# Patient Record
Sex: MALE | Race: WHITE | Employment: OTHER | URBAN - METROPOLITAN AREA
[De-identification: names, ages, dates, MRNs, and addresses within clinical notes are randomized per-mention and may not be internally consistent; named-entity substitution may affect disease eponyms.]

---

## 2017-09-22 ENCOUNTER — HOSPITAL ENCOUNTER (EMERGENCY)
Facility: CLINIC | Age: 77
Discharge: HOME | End: 2017-09-22
Attending: EMERGENCY MEDICINE
Payer: MEDICARE

## 2017-09-22 VITALS
TEMPERATURE: 98.4 F | HEART RATE: 90 BPM | OXYGEN SATURATION: 97 % | DIASTOLIC BLOOD PRESSURE: 91 MMHG | RESPIRATION RATE: 18 BRPM | SYSTOLIC BLOOD PRESSURE: 154 MMHG

## 2017-09-22 DIAGNOSIS — T14.8XXA WOUND INFECTION: Primary | ICD-10-CM

## 2017-09-22 DIAGNOSIS — L08.9 WOUND INFECTION: Primary | ICD-10-CM

## 2017-09-22 PROCEDURE — 99282 EMERGENCY DEPT VISIT SF MDM: CPT

## 2017-09-22 PROCEDURE — 6370000000 HC RX 637 (ALT 250 FOR IP): Performed by: EMERGENCY MEDICINE

## 2017-09-22 PROCEDURE — 87070 CULTURE OTHR SPECIMN AEROBIC: CPT

## 2017-09-22 PROCEDURE — 87205 SMEAR GRAM STAIN: CPT

## 2017-09-22 PROCEDURE — 87186 SC STD MICRODIL/AGAR DIL: CPT

## 2017-09-22 PROCEDURE — 87077 CULTURE AEROBIC IDENTIFY: CPT

## 2017-09-22 PROCEDURE — 86403 PARTICLE AGGLUT ANTBDY SCRN: CPT

## 2017-09-22 RX ORDER — IBUPROFEN 800 MG/1
800 TABLET ORAL EVERY 6 HOURS PRN
Status: ON HOLD | COMMUNITY
End: 2021-10-26 | Stop reason: SDUPTHER

## 2017-09-22 RX ORDER — CEPHALEXIN 500 MG/1
500 CAPSULE ORAL 4 TIMES DAILY
Qty: 28 CAPSULE | Refills: 0 | Status: SHIPPED | OUTPATIENT
Start: 2017-09-22 | End: 2017-09-29

## 2017-09-22 RX ORDER — SULFAMETHOXAZOLE AND TRIMETHOPRIM 800; 160 MG/1; MG/1
1 TABLET ORAL ONCE
Status: COMPLETED | OUTPATIENT
Start: 2017-09-22 | End: 2017-09-22

## 2017-09-22 RX ORDER — CEPHALEXIN 250 MG/1
500 CAPSULE ORAL ONCE
Status: COMPLETED | OUTPATIENT
Start: 2017-09-22 | End: 2017-09-22

## 2017-09-22 RX ORDER — SULFAMETHOXAZOLE AND TRIMETHOPRIM 800; 160 MG/1; MG/1
1 TABLET ORAL 2 TIMES DAILY
Qty: 14 TABLET | Refills: 0 | Status: SHIPPED | OUTPATIENT
Start: 2017-09-22 | End: 2017-09-29

## 2017-09-22 RX ADMIN — SULFAMETHOXAZOLE AND TRIMETHOPRIM 1 TABLET: 800; 160 TABLET ORAL at 19:49

## 2017-09-22 RX ADMIN — CEPHALEXIN 500 MG: 250 CAPSULE ORAL at 19:49

## 2017-09-25 ENCOUNTER — HOSPITAL ENCOUNTER (OUTPATIENT)
Dept: WOUND CARE | Age: 77
Discharge: HOME | End: 2017-09-25
Payer: MEDICARE

## 2017-09-25 ENCOUNTER — HOSPITAL ENCOUNTER (OUTPATIENT)
Age: 77
Discharge: HOME | End: 2017-09-25
Payer: MEDICARE

## 2017-09-25 VITALS
SYSTOLIC BLOOD PRESSURE: 155 MMHG | WEIGHT: 223 LBS | RESPIRATION RATE: 18 BRPM | BODY MASS INDEX: 29.55 KG/M2 | DIASTOLIC BLOOD PRESSURE: 91 MMHG | TEMPERATURE: 98.2 F | HEIGHT: 73 IN | HEART RATE: 87 BPM

## 2017-09-25 DIAGNOSIS — M86.471 CHRONIC OSTEOMYELITIS OF RIGHT FOOT WITH DRAINING SINUS (HCC): ICD-10-CM

## 2017-09-25 DIAGNOSIS — G60.9 IDIOPATHIC PERIPHERAL NEUROPATHY: ICD-10-CM

## 2017-09-25 LAB
ESTIMATED AVERAGE GLUCOSE: 117 MG/DL
HBA1C MFR BLD: 5.7 % (ref 4–6)

## 2017-09-25 PROCEDURE — 99204 OFFICE O/P NEW MOD 45 MIN: CPT

## 2017-09-25 PROCEDURE — 36415 COLL VENOUS BLD VENIPUNCTURE: CPT

## 2017-09-25 PROCEDURE — 83036 HEMOGLOBIN GLYCOSYLATED A1C: CPT

## 2017-09-25 ASSESSMENT — PAIN SCALES - GENERAL
PAINLEVEL_OUTOF10: 0
PAINLEVEL_OUTOF10: 0

## 2017-09-25 ASSESSMENT — PAIN DESCRIPTION - PAIN TYPE: TYPE: CHRONIC PAIN

## 2017-09-26 LAB
CULTURE: ABNORMAL
DIRECT EXAM: ABNORMAL
Lab: ABNORMAL
ORGANISM: ABNORMAL
SPECIMEN DESCRIPTION: ABNORMAL
SPECIMEN DESCRIPTION: ABNORMAL
STATUS: ABNORMAL

## 2017-10-09 ENCOUNTER — HOSPITAL ENCOUNTER (OUTPATIENT)
Age: 77
Discharge: HOME | End: 2017-10-09
Payer: MEDICARE

## 2017-10-09 ENCOUNTER — HOSPITAL ENCOUNTER (OUTPATIENT)
Dept: WOUND CARE | Age: 77
Discharge: HOME | End: 2017-10-09
Payer: MEDICARE

## 2017-10-09 VITALS
RESPIRATION RATE: 18 BRPM | DIASTOLIC BLOOD PRESSURE: 88 MMHG | BODY MASS INDEX: 29.55 KG/M2 | SYSTOLIC BLOOD PRESSURE: 147 MMHG | HEART RATE: 92 BPM | HEIGHT: 73 IN | TEMPERATURE: 96.8 F | WEIGHT: 223 LBS

## 2017-10-09 DIAGNOSIS — M86.471 CHRONIC OSTEOMYELITIS OF RIGHT FOOT WITH DRAINING SINUS (HCC): ICD-10-CM

## 2017-10-09 DIAGNOSIS — M86.471 CHRONIC OSTEOMYELITIS OF RIGHT FOOT WITH DRAINING SINUS (HCC): Primary | ICD-10-CM

## 2017-10-09 LAB — PREALBUMIN: 16.1 MG/DL (ref 20–40)

## 2017-10-09 PROCEDURE — 99213 OFFICE O/P EST LOW 20 MIN: CPT

## 2017-10-09 PROCEDURE — 84134 ASSAY OF PREALBUMIN: CPT

## 2017-10-09 PROCEDURE — 36415 COLL VENOUS BLD VENIPUNCTURE: CPT

## 2017-10-09 RX ORDER — DOXYCYCLINE HYCLATE 100 MG/1
100 CAPSULE ORAL 2 TIMES DAILY
COMMUNITY
End: 2017-10-16

## 2017-10-09 ASSESSMENT — PAIN SCALES - GENERAL: PAINLEVEL_OUTOF10: 0

## 2017-10-09 NOTE — PROGRESS NOTES
Patient: Celena Christine  YOB: 1940  MRN: [id number]      HPI: Kenya Cobos is a 76 y.o. male who was seen 10/9/2017 for follow-up at the [facility]. He is seen for follow up of osteomyelitis of the right foot. He has had an MRI of the right foot which shows persistent  Osteo of the first MP joint. His initial wound began in Oct 2016 in [place]. He had surgery at the [facility] in Feb 2017. He underwent 6 weeks of IV antibiotics in Aug 2017. The most recent MRI shows persistent osteo. He has developed pain and erythema on the tight foot starting on Oct 4. He was restarted on Doxycycline that date. Patient Active Problem List   Diagnosis Code    [facility] of right [facility] ([facility] M86.471    Idiopathic peripheral neuropathy G60.9     No past medical history on file. No past surgical history on file. Current Outpatient Prescriptions:     doxycycline hyclate (VIBRAMYCIN) 100 MG capsule, Take 100 mg by mouth 2 times daily Ordered per Dr Gómez Dang for 8 days, Disp: , Rfl:     ibuprofen (ADVIL;MOTRIN) 800 MG tablet, Take 800 mg by mouth every 6 hours as needed for Pain, Disp: , Rfl:   No Known Allergies  Social History   Substance Use Topics    Smoking status: Never Smoker    Smokeless tobacco: Not on file    Alcohol use No       REVIEW OF SYSTEMS:    CONSTITUTIONAL:  Denies fever, fatigue, weight loss or gain  HEENT:  Denies head trauma, change in vision, change in hearing, dysphagia or odynophagia  PULMONARY: denies shortness of breath, productive cough, or hemoptysis. CARDIOVASCULAR:Denies chest pain, palpitations, orthopnea,   GASTROINTESTINAL:  Denies abdominal pain, nausea, vomiting, diarrhea, constipation, hematochezia, melena or hematemesis.    GENITOURINARY:  Denies frequency, urgency or hesitation, denies pain with urination, denies hematuria  HEMATOLOGIC/LYMPHATIC:  Denies easy bruising or excessive bleeding, no hx of malignancy  MUSCULOSKELETAL: Denies the content of the visit, no guarantees can be provided that every mistake has been identified and corrected by editing.

## 2017-10-09 NOTE — PLAN OF CARE
Problem: Wound:  Goal: Will show signs of wound healing; wound closure and no evidence of infection  Will show signs of wound healing; wound closure and no evidence of infection   Outcome: Ongoing  Dakins moist to moist applied to right plantar foot wound covered with dry gauze and mefix tape. Continues to wear off loading shoe. To have testing ordered per Dr Belén Amaya prior to HBO treatments.     Problem: Falls - Risk of:  Goal: Will remain free from falls  Will remain free from falls   Outcome: Met This Shift   [place] VISIT

## 2017-10-10 ENCOUNTER — HOSPITAL ENCOUNTER (OUTPATIENT)
Age: 77
Setting detail: SPECIMEN
Discharge: HOME | End: 2017-10-10
Payer: MEDICARE

## 2017-10-11 ENCOUNTER — PRE-PROCEDURE TELEPHONE (OUTPATIENT)
Dept: WOUND CARE | Age: 77
End: 2017-10-11

## 2017-10-14 LAB
CULTURE: ABNORMAL
DIRECT EXAM: ABNORMAL
Lab: ABNORMAL
Lab: ABNORMAL
ORGANISM: ABNORMAL
SPECIMEN DESCRIPTION: ABNORMAL
SPECIMEN DESCRIPTION: ABNORMAL
STATUS: ABNORMAL
STATUS: ABNORMAL

## 2017-10-16 ENCOUNTER — HOSPITAL ENCOUNTER (OUTPATIENT)
Dept: WOUND CARE | Age: 77
Discharge: HOME | End: 2017-10-16
Payer: MEDICARE

## 2017-10-16 VITALS
RESPIRATION RATE: 18 BRPM | BODY MASS INDEX: 29.55 KG/M2 | SYSTOLIC BLOOD PRESSURE: 170 MMHG | TEMPERATURE: 97.3 F | HEART RATE: 85 BPM | WEIGHT: 223 LBS | HEIGHT: 73 IN | DIASTOLIC BLOOD PRESSURE: 87 MMHG

## 2017-10-16 DIAGNOSIS — E44.1 MILD PROTEIN MALNUTRITION (HCC): ICD-10-CM

## 2017-10-16 PROCEDURE — 99213 OFFICE O/P EST LOW 20 MIN: CPT

## 2017-10-16 RX ORDER — CEFEPIME 1 G/50ML
1 INJECTION, SOLUTION INTRAVENOUS EVERY 12 HOURS
COMMUNITY
End: 2017-12-11

## 2017-10-16 ASSESSMENT — PAIN SCALES - GENERAL: PAINLEVEL_OUTOF10: 0

## 2017-10-16 NOTE — PLAN OF CARE
Problem: Wound:  Goal: Will show signs of wound healing; wound closure and no evidence of infection  Will show signs of wound healing; wound closure and no evidence of infection   Outcome: Ongoing  Right foot wound dakins moist to moist applied covered with dry gauze and tape    Problem: Falls - Risk of:  Goal: Will remain free from falls  Will remain free from falls   Outcome: Met This Shift

## 2017-10-16 NOTE — PROGRESS NOTES
Patient: Trion Tello  YOB: 1940  MRN: [id number]      HPI: Brandon Willingham is a 76 y.o. male who was seen 10/16/2017 for follow-up at the [facility]. He is seen for follow up of a plantar ulcer of the right foot with osteomyelitis of the first metatarsal head. He was started on IV antibiotics at home for the recurrence of his foot infection. He feels the foot is better. He is having minimal drainage. He had his prealbumin done. The echocardiogram and vascular studies are scheduled for Oct 17. Patient Active Problem List   Diagnosis Code    Chronic [facility]omyel[facility] of right foot with draining [facility] ([facility] M86.471    Idiopathic peripheral neuropathy G60.9     No past medical history on file. No past surgical history on file. Current Outpatient Prescriptions:     ibuprofen (ADVIL;MOTRIN) 800 MG tablet, Take 800 mg by mouth every 6 hours as needed for Pain, Disp: , Rfl:   No Known Allergies  Social History   Substance Use Topics    Smoking status: Never Smoker    Smokeless tobacco: Not on file    Alcohol use No       REVIEW OF SYSTEMS:    CONSTITUTIONAL:  Denies fever, fatigue, weight loss or gain  HEENT:  Denies head trauma, change in vision, change in hearing, dysphagia or odynophagia  PULMONARY: denies shortness of breath, productive cough, or hemoptysis. CARDIOVASCULAR:Denies chest pain, palpitations, orthopnea,   GASTROINTESTINAL:  Denies abdominal pain, nausea, vomiting, diarrhea, constipation, hematochezia, melena or hematemesis.    GENITOURINARY:  Denies frequency, urgency or hesitation, denies pain with urination, denies hematuria  HEMATOLOGIC/LYMPHATIC:  Denies easy bruising or excessive bleeding, no hx of malignancy  MUSCULOSKELETAL: Denies muscle wasting, denies chronic pain  SKIN: Denies new skin lesions, rashes or abscess  ENDOCRINE:Denies cold or hot intolerance, changes in weight  HEME/LYMPH: no history malignancy, denies lymphatic swelling, no history of easy bruising or Yellow%Wound Bed 20 10/9/2017  9:05 AM   Margins Attached edges 9/25/2017 10:51 AM   Debridement per physician None 10/9/2017  9:05 AM   Time out N/A 10/9/2017  9:05 AM       Lab  Lab Results   Component Value Date    PREALBUMIN 16.1 10/09/2017   Culture & Susceptibility     METHICILLIN RESISTANT STAPHYLOCOCCUS AUREUS     Antibiotic Interpretation GIL Status   Induced Clind Resist Resistant POSITIVE Final   Synercid  NOT REPORTED Final   cefoxitin screen  NOT REPORTED Final   ciprofloxacin  NOT REPORTED Final   clindamycin Resistant <=0.25 RESISTANT Final   erythromycin Resistant >=8 RESISTANT Final   gentamicin Sensitive <=0.5 SUSCEPTIBLE Final   levofloxacin Resistant >=8 RESISTANT Final   linezolid  NOT REPORTED Final   moxifloxacin  NOT REPORTED Final   nitrofurantoin  NOT REPORTED Final   oxacillin Resistant >=4 RESISTANT Final   penicillin Resistant >=0.5 RESISTANT Final   rifampin  NOT REPORTED Final   tetracycline Resistant >=16 RESISTANT Final   tigecycline  NOT REPORTED Final   trimethoprim-sulfamethoxazole Resistant >=320 RESISTANT Final   vancomycin Sensitive 1 SUSCEPTIBLE Final         PSEUDOMONAS AERUGINOSA     Antibiotic Interpretation GIL Status   amikacin  NOT REPORTED Final   ampicillin  NOT REPORTED Final   ampicillin-sulbactam  NOT REPORTED Final   ceFAZolin  NOT REPORTED Final   cefTRIAXone  NOT REPORTED Final   cefepime Sensitive 2 SUSCEPTIBLE Final   ciprofloxacin Intermediate 2 INTERMEDIATE Final   gentamicin Sensitive <=1 SUSCEPTIBLE Final   meropenem  NOT REPORTED Final   nitrofurantoin  NOT REPORTED Final   piperacillin-tazobactam Sensitive 8 SUSCEPTIBLE Final   tigecycline  NOT REPORTED Final   tobramycin Sensitive <=1 SUSCEPTIBLE Final   trimethoprim-sulfamethoxazole  NOT REPORTED Final          Assessment:  1. Chronic osteomyelitis of right foot with draining sinus ([facility] [M86.471]  2. Idiopathic peripheral neuropathy [G60.9]  3.  Mild protein malnutrition ([facility] [E44.1]    Discussion: The patient's cellulitis is less with the initiation of IV antibiotics. I have reviewed the prealbumin results with him. I have reviewed his dietary habits. He has cereal and an occasional egg for breakfast, a sandwich for lunch, and a TV dinner in trhe evening. I have explained for wound healing I need to increase his nutrition. Plan:  1. Await results of echocardiogram and TCOM's.  2, Continue Dakin's moist dressing  3. Start protein supplements     This note was created with the assistance of a speech-recognition program.  Although the intention is to generate a document that actually reflects the content of the visit, no guarantees can be provided that every mistake has been identified and corrected by editing.

## 2017-10-16 NOTE — PLAN OF CARE
Area measuring . 8x1. 0 appears to be open on right 3rd plantar toe:however pt states this does this occasionally and heals on its own. He states it is not draining and not a wound.

## 2017-10-17 ENCOUNTER — HOSPITAL ENCOUNTER (OUTPATIENT)
Dept: VASCULAR LAB | Age: 77
Discharge: HOME | End: 2017-10-17
Payer: MEDICARE

## 2017-10-17 ENCOUNTER — HOSPITAL ENCOUNTER (OUTPATIENT)
Dept: NON INVASIVE DIAGNOSTICS | Age: 77
Discharge: HOME | End: 2017-10-17
Payer: MEDICARE

## 2017-10-17 DIAGNOSIS — M86.471 CHRONIC OSTEOMYELITIS OF RIGHT FOOT WITH DRAINING SINUS (HCC): ICD-10-CM

## 2017-10-17 LAB
LV EF: 55 %
LVEF MODALITY: NORMAL

## 2017-10-17 PROCEDURE — 93306 TTE W/DOPPLER COMPLETE: CPT

## 2017-10-17 PROCEDURE — 93923 UPR/LXTR ART STDY 3+ LVLS: CPT

## 2017-10-30 ENCOUNTER — HOSPITAL ENCOUNTER (OUTPATIENT)
Dept: WOUND CARE | Age: 77
Discharge: HOME | End: 2017-10-30
Payer: MEDICARE

## 2017-10-30 VITALS
DIASTOLIC BLOOD PRESSURE: 89 MMHG | TEMPERATURE: 96.9 F | RESPIRATION RATE: 20 BRPM | WEIGHT: 223 LBS | HEIGHT: 73 IN | BODY MASS INDEX: 29.55 KG/M2 | HEART RATE: 74 BPM | SYSTOLIC BLOOD PRESSURE: 188 MMHG

## 2017-10-30 PROCEDURE — 11042 DBRDMT SUBQ TIS 1ST 20SQCM/<: CPT

## 2017-10-30 NOTE — PLAN OF CARE
Noted to right foot third digit 0.2 x 0.3 x0.1  Spot all epithelial,intact 100% pink, no drainage noted

## 2017-10-30 NOTE — PLAN OF CARE
Problem: Wound:  Goal: Will show signs of wound healing; wound closure and no evidence of infection  Will show signs of wound healing; wound closure and no evidence of infection   Outcome: Ongoing  Wound appears healing, clean and intact- no evidence of infection in appearance. Pt is on IV antibiotics x 2 .  See lda's  Continue to assess skin integrity with each visit and note any change

## 2017-10-30 NOTE — PLAN OF CARE
Problem: Falls - Risk of:  Goal: Will remain free from falls  Will remain free from falls   Outcome: Met This Shift  NO FALLS DURING OUTPATIENT W[facility] VISIT, STAFF AT SIDE T/O VISIT, NOTED PT WITH A LIMP BUT STEADY GAIT

## 2017-10-30 NOTE — PROGRESS NOTES
Patient: Yane Stephenson  YOB: 1940  MRN: [id number]      HPI: Neville Carlisle is a 76 y.o. male who was seen 10/30/2017 for follow-up at the [facility]. He is seen for follow up of osteomyelitis of the right distal first metatarsal head. His history is well documented on my prior notes. He was started on Vancomycin one week ago. He reports almost no drainage. He did have his echocardiogram and TCO2 done. He has been on IV antibiotics for 3 weeks. Patient Active Problem List   Diagnosis Code    Chronic osteomyelitis of right foot with draining sinus ([facility]) M86.471    Idiopathic peripheral neuropathy G60.9    Mild protein malnutrition ([facility]) E44.1     Past Medical History:   Diagnosis Date    Arm fracture     Back injury     Eczema     Femur fracture ([facility])     GERD (gastroesophageal reflux disease)     Glaucoma     Hypertension     MRSA (methicillin resistant staph aureus) culture positive     pseudomonas e coli    MSSA (methicillin susceptible Staphylococcus aureus)     Osteomyelitis ([facility])     right foot     Shoulder fracture     Ulcerated, foot ([address]     wound     Past Surgical History:   Procedure Laterality Date    BACK SURGERY      COLONOSCOPY      HIP SURGERY      ORIF left hip       Current Outpatient Prescriptions:     vancomycin (VANCOCIN) 1000 MG injection, Infuse 1 g intravenously 2 times daily, Disp: , Rfl:     cefepime (MAXIPIME) 1 GM/50ML, Infuse 1 g intravenously every 12 hours, Disp: , Rfl:     ibuprofen (ADVIL;MOTRIN) 800 MG tablet, Take 800 mg by mouth every 6 hours as needed for Pain, Disp: , Rfl:   Allergies   Allergen Reactions    Sulfa Antibiotics      Social History   Substance Use Topics    Smoking status: Never Smoker    Smokeless tobacco: Never Used    Alcohol use No       REVIEW OF SYSTEMS:    CONSTITUTIONAL:  Denies fever, fatigue, weight loss or gain  HEENT:  Denies head trauma, change in vision, change in hearing, dysphagia or odynophagia.  No at anterior below great toe . Electrode C: 83 mmHg placed at anterior below 5th digit . Electrode D: 74 mmHg placed at posterior foot .    Left Measurements   Electrode E: 80 mmHg placed at anterior top of foot .        Conclusions        Summary        TcPO2 levels from the right foot and dorsum of the left foot show values    within the normal range which should be adequate for spontaneous wound    healing.        Signature        ----------------------------------------------------------------    Electronically signed by Suzie Moran(Sonographer) on    10/17/2017 03:01 PM    ----------------------------------------------------------------        ----------------------------------------------------------------    Electronically signed by Hailey De Anda(Interpreting   [address] [place] [address]) on 10/17/2017 11:52 PM    ----------------------------------------------------------------     Echocardiogram Complete 2D w Doppler w Color   Order: [id number]   Status:  Final result   Visible to patient:  No (Not Released) Next appt:  11/20/2017 at 10:00 AM in No Specialty Lelia Correa MD)   Details     Reading Physician Reading Date Result Priority   Unknown Provider Result 10/18/2017    Narrative     Transthoracic Echocardiography Report (TTE)     Patient Name Traci Calvillo      Date of Study               10/17/2017               Krys Donovan      Date of      1940  Gender                      Male   Birth      Age          77 year(s)  Race                              Room Number  OP          Height:                     73 inch, 185.42 cm      Corporate ID [id number]    Weight:                     220 pounds, 99.8 kg   #      Patient Acct [de-identified]   BSA:          2.24 m^2      BMI:      29.03   #                                                              kg/m^2      MR #         [id number]     Sonographer                 Lluvia Quinones      Accession #  [id number]   Interpreting Physician      Tamara Nunes      Fellow                   Referring Nurse                            Practitioner      Interpreting             Referring Physician         Rudy DANG     Type of Study      TTE procedure:2D Echocardiogram, M-Mode, Doppler, Color Doppler.     Procedure Date  Date: 10/17/2017 Start: 11:08 AM    Study Location: [facility]    History / Tech. Comments:  Chronic Osteomyelitis    Patient Status: Inpatient    Height: 73 inches Weight: 220 pounds BSA: 2.24 m^2 BMI: 29.03 kg/m^2    CONCLUSIONS    Summary  Normal LV systolic function with EF 55%. Reduced LV diastolic compliance. Mild mitral and tricuspid regurgitation. Signature  ----------------------------------------------------------------------------   Electronically signed by Phani Flores(Sonographer) on 10/17/2017 02:39   PM  ----------------------------------------------------------------------------    ----------------------------------------------------------------------------   Electronically signed by Tamara Nunes(Interpreting physician) on   10/18/2017 07:53 AM               Assessment:  1. Chronic osteomyelitis of right foot with draining sinus ([facility] [M86.471]  2. Idiopathic peripheral neuropathy [G60.9]  3. Mild protein malnutrition ([facility] [E44.1]    Discussion: Yane Layne has had osteomyelitis dating back almost two years involving the right foot. He has had surgery at the [facility]. He had a recurrence of the osteo in the Aug 2017 treated with six weeks of intravenous antibiotics. The wound failed to heal. He was seen in late September with persistent drainage. A repeat MRI was positive and he is again on antibiotics. He has TCO2 as documented above. He has a near normal echocardigram. He has had vascular studies at the [facility]. He is not diabetic. He would benefit from HBO treatment for his refractory osteomyelitis to salvage his foot and avoid at minimum a transmatetarsal amputation. Plan:  1. Debride wound today  2.  Refer for HBO

## 2017-11-20 ENCOUNTER — HOSPITAL ENCOUNTER (OUTPATIENT)
Dept: WOUND CARE | Age: 77
Discharge: HOME | End: 2017-11-20
Payer: MEDICARE

## 2017-11-20 VITALS
HEIGHT: 73 IN | WEIGHT: 223 LBS | SYSTOLIC BLOOD PRESSURE: 177 MMHG | BODY MASS INDEX: 29.55 KG/M2 | HEART RATE: 92 BPM | TEMPERATURE: 95.5 F | RESPIRATION RATE: 18 BRPM | DIASTOLIC BLOOD PRESSURE: 97 MMHG

## 2017-11-20 PROCEDURE — 11042 DBRDMT SUBQ TIS 1ST 20SQCM/<: CPT

## 2017-11-20 ASSESSMENT — PAIN DESCRIPTION - PAIN TYPE: TYPE: ACUTE PAIN

## 2017-11-20 NOTE — PROGRESS NOTES
Patient: Conchita Raines  YOB: 1940  MRN: [id number]      HPI: Osvaldo Richards is a 76 y.o. male who was seen 11/20/2017 for follow-up at the [facility]. He is seen for follow up of osteomyelitis of the right distal first metatarsal head. His history is well documented on my prior notes. He has one more day of IV antibiotics. He was seen by Dr. Jolanta Mercado for consideration of hyperbaric treatment on Oct 31. The decision was made not to proceed with HBO. Thepatient continues to use Dakin's to his wound. He is wearing regular shoes. His only concern at this time is leg pain related to his leg length discrepancy of 14mm following hip surgery two years ago.     Patient Active Problem List   Diagnosis Code    Chronic osteomyelitis of right foot with draining sinus ([facility]) M86.471    Idiopathic peripheral neuropathy G60.9    Mild protein malnutrition ([facility]) E44.1     Past Medical History:   Diagnosis Date    Arm fracture     Back injury     Eczema     Femur fracture ([facility])     GERD (gastroesophageal reflux disease)     Glaucoma     Hypertension     MRSA (methicillin resistant staph aureus) culture positive     pseudomonas e coli    MSSA (methicillin susceptible Staphylococcus aureus)     Osteomyelitis ([facility])     right foot     Shoulder fracture     Ulcerated, foot ([address]     wound     Past Surgical History:   Procedure Laterality Date    BACK SURGERY      COLONOSCOPY      HIP SURGERY      ORIF left hip       Current Outpatient Prescriptions:     vancomycin (VANCOCIN) 1000 MG injection, Infuse 1 g intravenously 2 times daily, Disp: , Rfl:     cefepime (MAXIPIME) 1 GM/50ML, Infuse 1 g intravenously every 12 hours, Disp: , Rfl:     ibuprofen (ADVIL;MOTRIN) 800 MG tablet, Take 800 mg by mouth every 6 hours as needed for Pain, Disp: , Rfl:   Allergies   Allergen Reactions    Sulfa Antibiotics      Social History   Substance Use Topics    Smoking status: Never Smoker    Smokeless tobacco: Never Used  Alcohol use No       REVIEW OF SYSTEMS:    CONSTITUTIONAL:  Denies fever, fatigue, weight loss or gain  HEENT:  Denies head trauma, change in vision, change in hearing, dysphagia or odynophagia  PULMONARY: denies shortness of breath, productive cough, or hemoptysis. CARDIOVASCULAR:Denies chest pain, palpitations, orthopnea,   GASTROINTESTINAL:  Denies abdominal pain, nausea, vomiting, diarrhea, constipation, hematochezia, melena or hematemesis. GENITOURINARY:  Denies frequency, urgency or hesitation, denies pain with urination, denies hematuria  HEMATOLOGIC/LYMPHATIC:  Denies easy bruising or excessive bleeding, no hx of malignancy  MUSCULOSKELETAL: Denies muscle wasting, denies chronic pain  SKIN: Denies new skin lesions, rashes or abscess  ENDOCRINE:Denies cold or hot intolerance, changes in weight  HEME/LYMPH: no history malignancy, denies lymphatic swelling, no history of easy bruising or bleeding. NEURO: Denies headache, double vision, gait abnormalities  PSYCH: Denies suicidal or homicidal ideations    Review of systems negative unless above.       Physical exam  CONSTITUTIONAL:  Alert and oriented, no acute distress  HEAD: normocephalic, atraumatic  EYES: Pupils equal and reactive to light, Extraocular muscles intact, sclera non icteric  ENT: Mucus membranes moist, No otorrhea, no rhinorrhea  NECK:  supple, symmetrical, trachea midline   LUNGS:  Good air movement bilaterally, unlabored respirations, no wheezes or rhonchi  CARDIOVASCULAR: Regular rate and rhythm, no murmurs rubs or gallops  ABDOMEN: soft, non tender, non distended, no rebound or guarding, no hernias, no hepatomegaly, no splenomegaly  : normal external  genitalia  MUSCULOSKELETAL:  Equal strength bilaterally, normal muscle tone  SKIN:  See wound assessment  NEUROLOGIC:  Cranial nerves 2-12 grossly intact, no focal deficits  PSYCH: affect appropriate          Wound Assessment    Pressure Ulcer 09/25/17 WD#1 RIGHT PLANTAR FOOT ETIOLOGY NEUROPATHY STARTED JANUARY 2016 (Active)   Lizy-wound Assessment Calloused 11/20/2017 10:06 AM   Lizy-Wound Texture Callus 11/20/2017 10:06 AM   Lizy-Wound Moisture Macerated 11/20/2017 10:06 AM   Wound Length (cm) 0.9 cm 11/20/2017 10:15 AM   Wound Width (cm) 0.7 cm 11/20/2017 10:15 AM   Wound Depth (cm)  0.3 11/20/2017 10:15 AM   Calculated Wound Size (cm^2) (l*w) 0.63 cm^2 11/20/2017 10:15 AM   Change in Wound Size % (l*w) 82.5 11/20/2017 10:15 AM   Dressing Changed Changed/New 10/30/2017 10:21 AM   Dressing/Treatment Other (Comment) 10/30/2017 10:21 AM   Wound Cleansed Rinsed/Irrigated with saline 11/20/2017 10:06 AM   Necrotic Amount None present 11/20/2017 10:06 AM   Drainage Amount Moderate 11/20/2017 10:06 AM   Drainage Description Serosanguinous 11/20/2017 10:06 AM   Odor None 11/20/2017 10:06 AM   Woody%Wound Bed 100 11/20/2017 10:06 AM   Margins Attached edges 9/25/2017 10:51 AM   Debridement per physician Subcutaneous 11/20/2017 10:15 AM   Time out Yes 11/20/2017 10:15 AM   Procedural Pain 0 11/20/2017 10:15 AM   Post procedural Pain 0 11/20/2017 10:15 AM         Assessment:  1. Chronic osteomyelitis of right foot with draining sinus ([facility] [M86.471]  2. Idiopathic peripheral neuropathy [G60.9]    Discussion: The patient's wound is smaller. There is biofilm that requires debridement. He will need a follow up MRI of the foot to see the results of his antibiotic therapy. Plan:  1. Debride wound today  2. Continue Dakin's moist dressings  3. F/U 3 weeks      Procedure:    A verbal consent was obtained. A timeout was performed. Lidocaine gel was applied    Debridement: Excisional Debridement    Using curette the wound was sharply debrided    down through and including the removal of subcutaneous tissue. Devitalized Tissue Debrided:  biofilm.     Percent of Wound Debrided: 100%  Total Surface Area Debrided:  0.63 sq cm  Bleeding: None  Hemostasis:   not needed    Response to treatment:  Well

## 2017-11-22 ENCOUNTER — OFFICE VISIT (OUTPATIENT)
Dept: INFECTIOUS DISEASES | Age: 77
End: 2017-11-22
Payer: MEDICARE

## 2017-11-22 VITALS
WEIGHT: 233 LBS | DIASTOLIC BLOOD PRESSURE: 94 MMHG | BODY MASS INDEX: 28.97 KG/M2 | HEART RATE: 99 BPM | SYSTOLIC BLOOD PRESSURE: 161 MMHG | TEMPERATURE: 97.6 F | HEIGHT: 75 IN

## 2017-11-22 DIAGNOSIS — S91.301D OPEN WOUND OF RIGHT FOOT, SUBSEQUENT ENCOUNTER: ICD-10-CM

## 2017-11-22 DIAGNOSIS — M86.671 OTHER CHRONIC OSTEOMYELITIS OF RIGHT FOOT (HCC): ICD-10-CM

## 2017-11-22 PROBLEM — M86.9 FOOT OSTEOMYELITIS, RIGHT (HCC): Status: ACTIVE | Noted: 2017-11-22

## 2017-11-22 PROBLEM — S91.301A OPEN WOUND OF RIGHT FOOT: Status: ACTIVE | Noted: 2017-11-22

## 2017-11-22 PROCEDURE — G8427 DOCREV CUR MEDS BY ELIG CLIN: HCPCS | Performed by: INTERNAL MEDICINE

## 2017-11-22 PROCEDURE — 1036F TOBACCO NON-USER: CPT | Performed by: INTERNAL MEDICINE

## 2017-11-22 PROCEDURE — 1123F ACP DISCUSS/DSCN MKR DOCD: CPT | Performed by: INTERNAL MEDICINE

## 2017-11-22 PROCEDURE — 4040F PNEUMOC VAC/ADMIN/RCVD: CPT | Performed by: INTERNAL MEDICINE

## 2017-11-22 PROCEDURE — G8419 CALC BMI OUT NRM PARAM NOF/U: HCPCS | Performed by: INTERNAL MEDICINE

## 2017-11-22 PROCEDURE — G8484 FLU IMMUNIZE NO ADMIN: HCPCS | Performed by: INTERNAL MEDICINE

## 2017-11-22 PROCEDURE — 99213 OFFICE O/P EST LOW 20 MIN: CPT | Performed by: INTERNAL MEDICINE

## 2017-11-23 NOTE — PROGRESS NOTES
intravenously every 12 hours, Disp: , Rfl:     ibuprofen (ADVIL;MOTRIN) 800 MG tablet, Take 800 mg by mouth every 6 hours as needed for Pain, Disp: , Rfl:      Review of Systems:  CONSTITUTIONAL:  negative  EYES:  negative  HEENT:  negative  RESPIRATORY:  negative  CARDIOVASCULAR:  negative  GASTROINTESTINAL:  negative  GENITOURINARY:  negative  INTEGUMENT/BREAST:  Rt foot wound   HEMATOLOGIC/LYMPHATIC:  negative  ALLERGIC/IMMUNOLOGIC:  negative  ENDOCRINE:  negative  MUSCULOSKELETAL:  negative  NEUROLOGICAL:  negative  BEHAVIOR/PSYCH:  negative    BP (!) 161/94   Pulse 99   Temp 97.6 °F (36.4 °C)   Ht 6' 3\" (1.905 m)   Wt 233 lb (105.7 kg)   BMI 29.12 kg/m²       EXAM:  CONSTITUTIONAL:  awake, alert, cooperative, no apparent distress,  EYES: conjunctiva normal  ENT:  Normocephalic, without obvious abnormality, atraumatic,  LUNGS:  No increased work of breathing, good air exchange, clear to auscultation bilaterally, no crackles or wheezing  CARDIOVASCULAR:  regular rate and rhythm, normal S1 and S2,  no murmur noted  ABDOMEN:   normal bowel sounds, soft, non-distended, non-tender, no masses palpated, no hepatosplenomegally,   MUSCULOSKELETAL:  There is no redness, warmth, or swelling of the joints. Rt foot wound with no purulence   NEUROLOGIC:  Awake, alert, oriented to name, place and time  SKIN:  No rash      Data Review:    CBC with Diff  No results found for requested labs within last 30 days. BMP  No results found for requested labs within last 30 days. LFT  No results found for requested labs within last 30 days. IMAGING:          Ko Cespedes MD  11/22/2017  8:21 PM      This note was completed using a voice transcription system. Every effort was made to ensure accuracy. However, inadvertent computerized transcription errors may be present.

## 2017-11-30 ENCOUNTER — PRE-PROCEDURE TELEPHONE (OUTPATIENT)
Dept: WOUND CARE | Age: 77
End: 2017-11-30

## 2017-12-11 ENCOUNTER — HOSPITAL ENCOUNTER (OUTPATIENT)
Dept: WOUND CARE | Age: 77
Discharge: HOME | End: 2017-12-11
Payer: MEDICARE

## 2017-12-11 VITALS
SYSTOLIC BLOOD PRESSURE: 182 MMHG | WEIGHT: 233 LBS | BODY MASS INDEX: 28.97 KG/M2 | HEIGHT: 75 IN | DIASTOLIC BLOOD PRESSURE: 98 MMHG | TEMPERATURE: 97.3 F | HEART RATE: 89 BPM

## 2017-12-11 PROCEDURE — 99212 OFFICE O/P EST SF 10 MIN: CPT

## 2017-12-11 ASSESSMENT — PAIN SCALES - GENERAL: PAINLEVEL_OUTOF10: 0

## 2017-12-11 NOTE — PROGRESS NOTES
Wound Width (cm) 1 cm 12/11/2017  9:00 AM   Wound Depth (cm)  0.4 12/11/2017  9:00 AM   Calculated Wound Size (cm^2) (l*w) 1 cm^2 12/11/2017  9:00 AM   Change in Wound Size % (l*w) 72.22 12/11/2017  9:00 AM   Dressing Changed Changed/New 11/20/2017 10:40 AM   Dressing/Treatment Other (Comment) 11/20/2017 10:40 AM   Wound Cleansed Rinsed/Irrigated with saline 11/20/2017 10:06 AM   Necrotic Amount None present 12/11/2017  9:00 AM   Drainage Amount Moderate 12/11/2017  9:00 AM   Drainage Description Serosanguinous 12/11/2017  9:00 AM   Odor None 11/20/2017 10:06 AM   [place]%Wound Bed 100 12/11/2017  9:00 AM   Margins Attached edges 9/25/2017 10:51 AM   Debridement per physician None 12/11/2017  9:00 AM   Time out N/A 12/11/2017  9:00 AM   Procedural Pain 0 11/20/2017 10:15 AM   Post procedural Pain 0 11/20/2017 10:15 AM            Assessment:  1. Chronic osteomyelitis of right foot with draining sinus ([facility] [M86.471]  2. Idiopathic peripheral neuropathy [G60.9]      Plan:  1. Continue Dakin's moist dressings  2. Follow-up on January 8, 2018  3. Consider repeating an MRI after as January 8 evaluation. This note was created with the assistance of a speech-recognition program.  Although the intention is to generate a document that actually reflects the content of the visit, no guarantees can be provided that every mistake has been identified and corrected by editing.

## 2017-12-12 ENCOUNTER — PRE-PROCEDURE TELEPHONE (OUTPATIENT)
Dept: WOUND CARE | Age: 77
End: 2017-12-12

## 2017-12-12 NOTE — PROGRESS NOTES
Pt leaves voicemail that he has an urgent request for an order for custom offloading inserts needing faxed to [phone] attn [facility] @ [address] This writer faxed request to Dr. Deuce Quigley office at [facility] and marked as urgent. I also called patient back and left a voicemail informing and gave phone # for Dr. Deuce Quigley office if needing further assistance as  will not be returning to [address] [place] [address] until after the holidays.      Electronically signed by Alejandro Patton RN on 12/12/2017 at 1:21 PM

## 2018-01-08 ENCOUNTER — HOSPITAL ENCOUNTER (OUTPATIENT)
Dept: WOUND CARE | Age: 78
Discharge: HOME | End: 2018-01-08
Payer: MEDICARE

## 2018-01-24 ENCOUNTER — OFFICE VISIT (OUTPATIENT)
Dept: INFECTIOUS DISEASES | Age: 78
End: 2018-01-24
Payer: MEDICARE

## 2018-01-24 VITALS
RESPIRATION RATE: 16 BRPM | SYSTOLIC BLOOD PRESSURE: 153 MMHG | HEART RATE: 84 BPM | OXYGEN SATURATION: 98 % | BODY MASS INDEX: 30.93 KG/M2 | DIASTOLIC BLOOD PRESSURE: 94 MMHG | HEIGHT: 73 IN | WEIGHT: 233.4 LBS | TEMPERATURE: 97 F

## 2018-01-24 DIAGNOSIS — M86.671 OTHER CHRONIC OSTEOMYELITIS OF RIGHT FOOT (HCC): ICD-10-CM

## 2018-01-24 DIAGNOSIS — S91.301D OPEN WOUND OF RIGHT FOOT, SUBSEQUENT ENCOUNTER: Primary | ICD-10-CM

## 2018-01-24 PROCEDURE — 99213 OFFICE O/P EST LOW 20 MIN: CPT | Performed by: INTERNAL MEDICINE

## 2018-01-24 PROCEDURE — 4040F PNEUMOC VAC/ADMIN/RCVD: CPT | Performed by: INTERNAL MEDICINE

## 2018-01-24 PROCEDURE — 1036F TOBACCO NON-USER: CPT | Performed by: INTERNAL MEDICINE

## 2018-01-24 PROCEDURE — G8427 DOCREV CUR MEDS BY ELIG CLIN: HCPCS | Performed by: INTERNAL MEDICINE

## 2018-01-24 PROCEDURE — 1123F ACP DISCUSS/DSCN MKR DOCD: CPT | Performed by: INTERNAL MEDICINE

## 2018-01-24 PROCEDURE — G8484 FLU IMMUNIZE NO ADMIN: HCPCS | Performed by: INTERNAL MEDICINE

## 2018-01-24 PROCEDURE — G8417 CALC BMI ABV UP PARAM F/U: HCPCS | Performed by: INTERNAL MEDICINE

## 2018-01-24 RX ORDER — CRISABOROLE 20 MG/G
1 OINTMENT TOPICAL PRN
Refills: 2 | Status: ON HOLD | COMMUNITY
Start: 2017-12-01 | End: 2021-10-26 | Stop reason: HOSPADM

## 2018-01-25 ENCOUNTER — HOSPITAL ENCOUNTER (OUTPATIENT)
Age: 78
Setting detail: SPECIMEN
Discharge: HOME | End: 2018-01-25
Payer: MEDICARE

## 2018-01-29 LAB — DERMATOLOGY PATHOLOGY REPORT: NORMAL

## 2018-02-16 ENCOUNTER — TELEPHONE (OUTPATIENT)
Dept: INFECTIOUS DISEASES | Age: 78
End: 2018-02-16

## 2018-02-16 NOTE — TELEPHONE ENCOUNTER
Dr. Nahid White did a bone biopsy on right first ray , his mri showed positive for Osteo. They put him on Cipro for two Weeks until bone culture comes back. . After it is back they will consult with you. What kind of options for chronic abx are there. ..        I can call J Luis Adame back at [phone]

## 2018-02-23 NOTE — TELEPHONE ENCOUNTER
Marysol Mensah MD   0\"   2018 1:38 PM   Shukri Vasquez ( 1940) what was your discussion with Dr. Ramez Olivera yesterday? patient called and is confused. Betty Diallo at the office. Read 2018 1:44 PM   0\"   2018 1:44 PM   I recommend doxy. Setup f/u with me next wk so j can disuss   patient states that he stated taking medication yesterday and will be Wednesday the .

## 2018-02-28 ENCOUNTER — OFFICE VISIT (OUTPATIENT)
Dept: INFECTIOUS DISEASES | Age: 78
End: 2018-02-28
Payer: MEDICARE

## 2018-02-28 VITALS
OXYGEN SATURATION: 98 % | HEART RATE: 84 BPM | BODY MASS INDEX: 29.82 KG/M2 | SYSTOLIC BLOOD PRESSURE: 161 MMHG | HEIGHT: 73 IN | TEMPERATURE: 98.1 F | RESPIRATION RATE: 18 BRPM | DIASTOLIC BLOOD PRESSURE: 90 MMHG | WEIGHT: 225 LBS

## 2018-02-28 DIAGNOSIS — S72.90XD CLOSED FRACTURE OF FEMUR WITH ROUTINE HEALING, UNSPECIFIED FRACTURE MORPHOLOGY, UNSPECIFIED LATERALITY, UNSPECIFIED PORTION OF FEMUR, SUBSEQUENT ENCOUNTER: Primary | ICD-10-CM

## 2018-02-28 DIAGNOSIS — M86.671 OTHER CHRONIC OSTEOMYELITIS OF RIGHT FOOT (HCC): ICD-10-CM

## 2018-02-28 DIAGNOSIS — S91.301D OPEN WOUND OF RIGHT FOOT, SUBSEQUENT ENCOUNTER: ICD-10-CM

## 2018-02-28 PROCEDURE — G8417 CALC BMI ABV UP PARAM F/U: HCPCS | Performed by: INTERNAL MEDICINE

## 2018-02-28 PROCEDURE — 99213 OFFICE O/P EST LOW 20 MIN: CPT | Performed by: INTERNAL MEDICINE

## 2018-02-28 PROCEDURE — 4040F PNEUMOC VAC/ADMIN/RCVD: CPT | Performed by: INTERNAL MEDICINE

## 2018-02-28 PROCEDURE — G8427 DOCREV CUR MEDS BY ELIG CLIN: HCPCS | Performed by: INTERNAL MEDICINE

## 2018-02-28 PROCEDURE — 1036F TOBACCO NON-USER: CPT | Performed by: INTERNAL MEDICINE

## 2018-02-28 PROCEDURE — G8484 FLU IMMUNIZE NO ADMIN: HCPCS | Performed by: INTERNAL MEDICINE

## 2018-02-28 PROCEDURE — 1123F ACP DISCUSS/DSCN MKR DOCD: CPT | Performed by: INTERNAL MEDICINE

## 2018-02-28 RX ORDER — DOXYCYCLINE HYCLATE 100 MG/1
1 CAPSULE ORAL 2 TIMES DAILY
Refills: 0 | Status: ON HOLD | COMMUNITY
Start: 2018-02-22 | End: 2021-10-26 | Stop reason: HOSPADM

## 2018-02-28 RX ORDER — CIPROFLOXACIN 500 MG/1
1 TABLET, FILM COATED ORAL DAILY
Refills: 0 | Status: ON HOLD | COMMUNITY
Start: 2018-02-16 | End: 2021-10-26 | Stop reason: HOSPADM

## 2018-02-28 RX ORDER — DOXYCYCLINE HYCLATE 100 MG
100 TABLET ORAL 2 TIMES DAILY
Qty: 14 TABLET | Refills: 0 | Status: SHIPPED | OUTPATIENT
Start: 2018-02-28 | End: 2018-03-07

## 2018-04-09 ENCOUNTER — TELEPHONE (OUTPATIENT)
Dept: INFECTIOUS DISEASES | Age: 78
End: 2018-04-09

## 2018-04-11 ENCOUNTER — HOSPITAL ENCOUNTER (OUTPATIENT)
Age: 78
Setting detail: SPECIMEN
Discharge: HOME | End: 2018-04-11
Payer: MEDICARE

## 2018-04-11 ENCOUNTER — OFFICE VISIT (OUTPATIENT)
Dept: INFECTIOUS DISEASES | Age: 78
End: 2018-04-11
Payer: MEDICARE

## 2018-04-11 ENCOUNTER — HOSPITAL ENCOUNTER (OUTPATIENT)
Age: 78
Discharge: HOME | End: 2018-04-11
Payer: MEDICARE

## 2018-04-11 VITALS
HEART RATE: 103 BPM | TEMPERATURE: 97.7 F | WEIGHT: 231 LBS | HEIGHT: 73 IN | RESPIRATION RATE: 12 BRPM | SYSTOLIC BLOOD PRESSURE: 176 MMHG | BODY MASS INDEX: 30.62 KG/M2 | DIASTOLIC BLOOD PRESSURE: 94 MMHG

## 2018-04-11 DIAGNOSIS — S91.301D OPEN WOUND OF RIGHT FOOT, SUBSEQUENT ENCOUNTER: ICD-10-CM

## 2018-04-11 DIAGNOSIS — M86.9 OSTEOMYELITIS OF FOOT, UNSPECIFIED LATERALITY, UNSPECIFIED TYPE (HCC): Primary | ICD-10-CM

## 2018-04-11 DIAGNOSIS — M86.9 OSTEOMYELITIS OF FOOT, UNSPECIFIED LATERALITY, UNSPECIFIED TYPE (HCC): ICD-10-CM

## 2018-04-11 LAB
ABSOLUTE EOS #: 0.19 K/UL (ref 0–0.44)
ABSOLUTE IMMATURE GRANULOCYTE: 0.1 K/UL (ref 0–0.3)
ABSOLUTE LYMPH #: 2.16 K/UL (ref 1.1–3.7)
ABSOLUTE MONO #: 1.43 K/UL (ref 0.1–1.2)
ALBUMIN SERPL-MCNC: 3.8 G/DL (ref 3.5–5.2)
ALBUMIN/GLOBULIN RATIO: 1 (ref 1–2.5)
ALP BLD-CCNC: 84 U/L (ref 40–129)
ALT SERPL-CCNC: 14 U/L (ref 5–41)
AST SERPL-CCNC: 16 U/L
BASOPHILS # BLD: 0 % (ref 0–2)
BASOPHILS ABSOLUTE: 0.04 K/UL (ref 0–0.2)
BILIRUB SERPL-MCNC: 0.22 MG/DL (ref 0.3–1.2)
BILIRUBIN DIRECT: 0.09 MG/DL
BILIRUBIN, INDIRECT: 0.13 MG/DL (ref 0–1)
CREAT SERPL-MCNC: 0.88 MG/DL (ref 0.7–1.2)
DIFFERENTIAL TYPE: ABNORMAL
EOSINOPHILS RELATIVE PERCENT: 1 % (ref 1–4)
GFR AFRICAN AMERICAN: >60 ML/MIN
GFR NON-AFRICAN AMERICAN: >60 ML/MIN
GFR SERPL CREATININE-BSD FRML MDRD: NORMAL ML/MIN/{1.73_M2}
GFR SERPL CREATININE-BSD FRML MDRD: NORMAL ML/MIN/{1.73_M2}
GLOBULIN: ABNORMAL G/DL (ref 1.5–3.8)
IMMATURE GRANULOCYTES: 1 %
LYMPHOCYTES # BLD: 16 % (ref 24–43)
MONOCYTES # BLD: 11 % (ref 3–12)
PLATELET # BLD: 341 K/UL (ref 138–453)
PLATELET ESTIMATE: ABNORMAL
RBC # BLD: ABNORMAL 10*6/UL
SEG NEUTROPHILS: 71 % (ref 36–65)
SEGMENTED NEUTROPHILS ABSOLUTE COUNT: 9.76 K/UL (ref 1.5–8.1)
TOTAL PROTEIN: 7.5 G/DL (ref 6.4–8.3)
WBC # BLD: 13.7 K/UL (ref 3.5–11.3)
WBC # BLD: ABNORMAL 10*3/UL

## 2018-04-11 PROCEDURE — G8417 CALC BMI ABV UP PARAM F/U: HCPCS | Performed by: INTERNAL MEDICINE

## 2018-04-11 PROCEDURE — 80076 HEPATIC FUNCTION PANEL: CPT

## 2018-04-11 PROCEDURE — 99213 OFFICE O/P EST LOW 20 MIN: CPT | Performed by: INTERNAL MEDICINE

## 2018-04-11 PROCEDURE — 82565 ASSAY OF CREATININE: CPT

## 2018-04-11 PROCEDURE — 1036F TOBACCO NON-USER: CPT | Performed by: INTERNAL MEDICINE

## 2018-04-11 PROCEDURE — 85048 AUTOMATED LEUKOCYTE COUNT: CPT

## 2018-04-11 PROCEDURE — G8427 DOCREV CUR MEDS BY ELIG CLIN: HCPCS | Performed by: INTERNAL MEDICINE

## 2018-04-11 PROCEDURE — 1123F ACP DISCUSS/DSCN MKR DOCD: CPT | Performed by: INTERNAL MEDICINE

## 2018-04-11 PROCEDURE — 85049 AUTOMATED PLATELET COUNT: CPT

## 2018-04-11 PROCEDURE — 4040F PNEUMOC VAC/ADMIN/RCVD: CPT | Performed by: INTERNAL MEDICINE

## 2018-04-11 PROCEDURE — 36415 COLL VENOUS BLD VENIPUNCTURE: CPT

## 2018-04-11 RX ORDER — CIPROFLOXACIN 500 MG/1
500 TABLET, FILM COATED ORAL 2 TIMES DAILY
Qty: 28 TABLET | Refills: 0 | Status: SHIPPED | OUTPATIENT
Start: 2018-04-11 | End: 2018-04-25

## 2018-04-11 RX ORDER — CEPHALEXIN 500 MG/1
CAPSULE ORAL
Refills: 0 | Status: ON HOLD | COMMUNITY
Start: 2018-03-14 | End: 2021-10-26 | Stop reason: HOSPADM

## 2018-04-12 PROBLEM — S91.301A OPEN WOUND OF RIGHT FOOT: Status: ACTIVE | Noted: 2018-04-12

## 2018-04-13 LAB
CULTURE: ABNORMAL
CULTURE: ABNORMAL
DIRECT EXAM: ABNORMAL
Lab: ABNORMAL
ORGANISM: ABNORMAL
SPECIMEN DESCRIPTION: ABNORMAL
STATUS: ABNORMAL

## 2018-04-17 DIAGNOSIS — L03.90 WOUND CELLULITIS: Primary | ICD-10-CM

## 2018-04-17 RX ORDER — LINEZOLID 600 MG/1
600 TABLET, FILM COATED ORAL 2 TIMES DAILY
Qty: 28 TABLET | Refills: 0 | Status: SHIPPED | OUTPATIENT
Start: 2018-04-17 | End: 2018-05-01

## 2018-04-18 ENCOUNTER — TELEPHONE (OUTPATIENT)
Dept: INFECTIOUS DISEASES | Age: 78
End: 2018-04-18

## 2018-04-18 RX ORDER — LINEZOLID 600 MG/1
600 TABLET, FILM COATED ORAL 2 TIMES DAILY
Qty: 28 TABLET | Refills: 0 | Status: SHIPPED | OUTPATIENT
Start: 2018-04-18 | End: 2018-05-02

## 2018-05-16 DIAGNOSIS — M86.471 CHRONIC OSTEOMYELITIS OF RIGHT FOOT WITH DRAINING SINUS (HCC): Primary | ICD-10-CM

## 2018-05-16 DIAGNOSIS — E44.1 MILD PROTEIN MALNUTRITION (HCC): ICD-10-CM

## 2018-05-17 ENCOUNTER — TELEPHONE (OUTPATIENT)
Dept: INFECTIOUS DISEASES | Age: 78
End: 2018-05-17

## 2018-05-17 ENCOUNTER — OFFICE VISIT (OUTPATIENT)
Dept: INFECTIOUS DISEASES | Age: 78
End: 2018-05-17
Payer: MEDICARE

## 2018-05-17 ENCOUNTER — HOSPITAL ENCOUNTER (OUTPATIENT)
Age: 78
Discharge: HOME | End: 2018-05-17
Payer: MEDICARE

## 2018-05-17 VITALS
RESPIRATION RATE: 12 BRPM | WEIGHT: 232 LBS | HEIGHT: 73 IN | HEART RATE: 86 BPM | BODY MASS INDEX: 30.75 KG/M2 | SYSTOLIC BLOOD PRESSURE: 179 MMHG | DIASTOLIC BLOOD PRESSURE: 90 MMHG | TEMPERATURE: 97.5 F | OXYGEN SATURATION: 96 %

## 2018-05-17 DIAGNOSIS — S91.301D OPEN WOUND OF RIGHT FOOT, SUBSEQUENT ENCOUNTER: ICD-10-CM

## 2018-05-17 DIAGNOSIS — D72.829 LEUKOCYTOSIS, UNSPECIFIED TYPE: ICD-10-CM

## 2018-05-17 DIAGNOSIS — D72.829 LEUKOCYTOSIS, UNSPECIFIED TYPE: Primary | ICD-10-CM

## 2018-05-17 DIAGNOSIS — M86.9 OSTEOMYELITIS OF FOOT, UNSPECIFIED LATERALITY, UNSPECIFIED TYPE (HCC): ICD-10-CM

## 2018-05-17 LAB
ABSOLUTE EOS #: 0.19 K/UL (ref 0–0.44)
ABSOLUTE IMMATURE GRANULOCYTE: 0.07 K/UL (ref 0–0.3)
ABSOLUTE LYMPH #: 2.02 K/UL (ref 1.1–3.7)
ABSOLUTE MONO #: 1.2 K/UL (ref 0.1–1.2)
BASOPHILS # BLD: 0 % (ref 0–2)
BASOPHILS ABSOLUTE: 0.03 K/UL (ref 0–0.2)
DIFFERENTIAL TYPE: ABNORMAL
EOSINOPHILS RELATIVE PERCENT: 2 % (ref 1–4)
IMMATURE GRANULOCYTES: 1 %
LYMPHOCYTES # BLD: 17 % (ref 24–43)
MONOCYTES # BLD: 10 % (ref 3–12)
PLATELET ESTIMATE: ABNORMAL
RBC # BLD: ABNORMAL 10*6/UL
SEG NEUTROPHILS: 71 % (ref 36–65)
SEGMENTED NEUTROPHILS ABSOLUTE COUNT: 8.68 K/UL (ref 1.5–8.1)
WBC # BLD: 12.2 K/UL (ref 3.5–11.3)
WBC # BLD: ABNORMAL 10*3/UL

## 2018-05-17 PROCEDURE — 1036F TOBACCO NON-USER: CPT | Performed by: INTERNAL MEDICINE

## 2018-05-17 PROCEDURE — G8417 CALC BMI ABV UP PARAM F/U: HCPCS | Performed by: INTERNAL MEDICINE

## 2018-05-17 PROCEDURE — G8427 DOCREV CUR MEDS BY ELIG CLIN: HCPCS | Performed by: INTERNAL MEDICINE

## 2018-05-17 PROCEDURE — 85048 AUTOMATED LEUKOCYTE COUNT: CPT

## 2018-05-17 PROCEDURE — 99213 OFFICE O/P EST LOW 20 MIN: CPT | Performed by: INTERNAL MEDICINE

## 2018-05-17 PROCEDURE — 36415 COLL VENOUS BLD VENIPUNCTURE: CPT

## 2018-05-17 PROCEDURE — 1123F ACP DISCUSS/DSCN MKR DOCD: CPT | Performed by: INTERNAL MEDICINE

## 2018-05-17 PROCEDURE — 4040F PNEUMOC VAC/ADMIN/RCVD: CPT | Performed by: INTERNAL MEDICINE

## 2018-06-25 ENCOUNTER — TELEPHONE (OUTPATIENT)
Dept: INFECTIOUS DISEASES | Age: 78
End: 2018-06-25

## 2018-07-05 ENCOUNTER — TELEPHONE (OUTPATIENT)
Dept: INFECTIOUS DISEASES | Age: 78
End: 2018-07-05

## 2018-07-05 NOTE — TELEPHONE ENCOUNTER
rec'd voicemail message from pt re: Dr. Christie Arreaga trying to send something to his PCP but patient states that he does not have a PCP that his is semi-retired and the wound-care clinic where he was being seen closed down. I returned Mr. Vazquez's call but got his voicemail. Left message asking him to call the office back tomorrow to resolve.

## 2021-10-21 ENCOUNTER — HOSPITAL ENCOUNTER (INPATIENT)
Age: 81
LOS: 5 days | Discharge: HOME HEALTH | DRG: 617 | End: 2021-10-26
Attending: EMERGENCY MEDICINE | Admitting: INTERNAL MEDICINE
Payer: MEDICARE

## 2021-10-21 ENCOUNTER — APPOINTMENT (OUTPATIENT)
Dept: GENERAL RADIOLOGY | Age: 81
DRG: 617 | End: 2021-10-21
Payer: MEDICARE

## 2021-10-21 DIAGNOSIS — G89.18 ACUTE POST-OPERATIVE PAIN: ICD-10-CM

## 2021-10-21 DIAGNOSIS — M86.171 OTHER ACUTE OSTEOMYELITIS OF RIGHT FOOT (HCC): Primary | ICD-10-CM

## 2021-10-21 PROBLEM — M86.9 OSTEOMYELITIS OF RIGHT FOOT (HCC): Status: ACTIVE | Noted: 2021-10-21

## 2021-10-21 LAB
ABSOLUTE EOS #: 0.31 K/UL (ref 0–0.4)
ABSOLUTE IMMATURE GRANULOCYTE: 0 K/UL (ref 0–0.3)
ABSOLUTE LYMPH #: 2.95 K/UL (ref 1–4.8)
ABSOLUTE MONO #: 1.55 K/UL (ref 0.1–0.8)
ANION GAP SERPL CALCULATED.3IONS-SCNC: 11 MMOL/L (ref 9–17)
BASOPHILS # BLD: 0 % (ref 0–2)
BASOPHILS ABSOLUTE: 0 K/UL (ref 0–0.2)
BUN BLDV-MCNC: 20 MG/DL (ref 8–23)
BUN/CREAT BLD: NORMAL (ref 9–20)
CALCIUM SERPL-MCNC: 8.9 MG/DL (ref 8.6–10.4)
CHLORIDE BLD-SCNC: 104 MMOL/L (ref 98–107)
CO2: 22 MMOL/L (ref 20–31)
CREAT SERPL-MCNC: 0.97 MG/DL (ref 0.7–1.2)
DIFFERENTIAL TYPE: ABNORMAL
EOSINOPHILS RELATIVE PERCENT: 2 % (ref 1–4)
GFR AFRICAN AMERICAN: >60 ML/MIN
GFR NON-AFRICAN AMERICAN: >60 ML/MIN
GFR SERPL CREATININE-BSD FRML MDRD: NORMAL ML/MIN/{1.73_M2}
GFR SERPL CREATININE-BSD FRML MDRD: NORMAL ML/MIN/{1.73_M2}
GLUCOSE BLD-MCNC: 127 MG/DL (ref 75–110)
GLUCOSE BLD-MCNC: 97 MG/DL (ref 70–99)
HCT VFR BLD CALC: 41.3 % (ref 40.7–50.3)
HEMOGLOBIN: 13.4 G/DL (ref 13–17)
IMMATURE GRANULOCYTES: 0 %
LACTIC ACID, SEPSIS WHOLE BLOOD: 1.4 MMOL/L (ref 0.5–1.9)
LACTIC ACID, SEPSIS: NORMAL MMOL/L (ref 0.5–1.9)
LYMPHOCYTES # BLD: 19 % (ref 24–44)
MCH RBC QN AUTO: 28.8 PG (ref 25.2–33.5)
MCHC RBC AUTO-ENTMCNC: 32.4 G/DL (ref 28.4–34.8)
MCV RBC AUTO: 88.6 FL (ref 82.6–102.9)
MONOCYTES # BLD: 10 % (ref 1–7)
MORPHOLOGY: NORMAL
NRBC AUTOMATED: 0 PER 100 WBC
PDW BLD-RTO: 13.9 % (ref 11.8–14.4)
PLATELET # BLD: 483 K/UL (ref 138–453)
PLATELET ESTIMATE: ABNORMAL
PMV BLD AUTO: 9.2 FL (ref 8.1–13.5)
POTASSIUM SERPL-SCNC: 4.2 MMOL/L (ref 3.7–5.3)
RBC # BLD: 4.66 M/UL (ref 4.21–5.77)
RBC # BLD: ABNORMAL 10*6/UL
SEG NEUTROPHILS: 69 % (ref 36–66)
SEGMENTED NEUTROPHILS ABSOLUTE COUNT: 10.69 K/UL (ref 1.8–7.7)
SODIUM BLD-SCNC: 137 MMOL/L (ref 135–144)
WBC # BLD: 15.5 K/UL (ref 3.5–11.3)
WBC # BLD: ABNORMAL 10*3/UL

## 2021-10-21 PROCEDURE — 87075 CULTR BACTERIA EXCEPT BLOOD: CPT

## 2021-10-21 PROCEDURE — 6360000002 HC RX W HCPCS: Performed by: STUDENT IN AN ORGANIZED HEALTH CARE EDUCATION/TRAINING PROGRAM

## 2021-10-21 PROCEDURE — 2580000003 HC RX 258: Performed by: STUDENT IN AN ORGANIZED HEALTH CARE EDUCATION/TRAINING PROGRAM

## 2021-10-21 PROCEDURE — 87185 SC STD ENZYME DETCJ PER NZM: CPT

## 2021-10-21 PROCEDURE — 87040 BLOOD CULTURE FOR BACTERIA: CPT

## 2021-10-21 PROCEDURE — 87076 CULTURE ANAEROBE IDENT EACH: CPT

## 2021-10-21 PROCEDURE — 85025 COMPLETE CBC W/AUTO DIFF WBC: CPT

## 2021-10-21 PROCEDURE — 87070 CULTURE OTHR SPECIMN AEROBIC: CPT

## 2021-10-21 PROCEDURE — 86403 PARTICLE AGGLUT ANTBDY SCRN: CPT

## 2021-10-21 PROCEDURE — 82947 ASSAY GLUCOSE BLOOD QUANT: CPT

## 2021-10-21 PROCEDURE — 83605 ASSAY OF LACTIC ACID: CPT

## 2021-10-21 PROCEDURE — 99223 1ST HOSP IP/OBS HIGH 75: CPT | Performed by: NURSE PRACTITIONER

## 2021-10-21 PROCEDURE — 2580000003 HC RX 258: Performed by: NURSE PRACTITIONER

## 2021-10-21 PROCEDURE — 87205 SMEAR GRAM STAIN: CPT

## 2021-10-21 PROCEDURE — 36415 COLL VENOUS BLD VENIPUNCTURE: CPT

## 2021-10-21 PROCEDURE — 80048 BASIC METABOLIC PNL TOTAL CA: CPT

## 2021-10-21 PROCEDURE — 87077 CULTURE AEROBIC IDENTIFY: CPT

## 2021-10-21 PROCEDURE — 1200000000 HC SEMI PRIVATE

## 2021-10-21 PROCEDURE — 87186 SC STD MICRODIL/AGAR DIL: CPT

## 2021-10-21 PROCEDURE — 73630 X-RAY EXAM OF FOOT: CPT

## 2021-10-21 PROCEDURE — 99284 EMERGENCY DEPT VISIT MOD MDM: CPT

## 2021-10-21 RX ORDER — SODIUM CHLORIDE 9 MG/ML
INJECTION, SOLUTION INTRAVENOUS CONTINUOUS
Status: DISCONTINUED | OUTPATIENT
Start: 2021-10-21 | End: 2021-10-22

## 2021-10-21 RX ORDER — SODIUM CHLORIDE 9 MG/ML
25 INJECTION, SOLUTION INTRAVENOUS PRN
Status: DISCONTINUED | OUTPATIENT
Start: 2021-10-21 | End: 2021-10-26 | Stop reason: HOSPADM

## 2021-10-21 RX ORDER — SODIUM CHLORIDE 0.9 % (FLUSH) 0.9 %
10 SYRINGE (ML) INJECTION PRN
Status: DISCONTINUED | OUTPATIENT
Start: 2021-10-21 | End: 2021-10-26 | Stop reason: HOSPADM

## 2021-10-21 RX ORDER — POLYETHYLENE GLYCOL 3350 17 G/17G
17 POWDER, FOR SOLUTION ORAL DAILY PRN
Status: DISCONTINUED | OUTPATIENT
Start: 2021-10-21 | End: 2021-10-24

## 2021-10-21 RX ORDER — NICOTINE POLACRILEX 4 MG
15 LOZENGE BUCCAL PRN
Status: DISCONTINUED | OUTPATIENT
Start: 2021-10-21 | End: 2021-10-26 | Stop reason: HOSPADM

## 2021-10-21 RX ORDER — SODIUM CHLORIDE 0.9 % (FLUSH) 0.9 %
5-40 SYRINGE (ML) INJECTION EVERY 12 HOURS SCHEDULED
Status: DISCONTINUED | OUTPATIENT
Start: 2021-10-21 | End: 2021-10-26 | Stop reason: HOSPADM

## 2021-10-21 RX ORDER — VANCOMYCIN HYDROCHLORIDE 1 G/200ML
1000 INJECTION, SOLUTION INTRAVENOUS ONCE
Status: DISCONTINUED | OUTPATIENT
Start: 2021-10-21 | End: 2021-10-21

## 2021-10-21 RX ORDER — POTASSIUM BICARBONATE 25 MEQ/1
50 TABLET, EFFERVESCENT ORAL PRN
Status: DISCONTINUED | OUTPATIENT
Start: 2021-10-21 | End: 2021-10-26 | Stop reason: HOSPADM

## 2021-10-21 RX ORDER — POTASSIUM CHLORIDE 20 MEQ/1
40 TABLET, EXTENDED RELEASE ORAL PRN
Status: DISCONTINUED | OUTPATIENT
Start: 2021-10-21 | End: 2021-10-26 | Stop reason: HOSPADM

## 2021-10-21 RX ORDER — POTASSIUM CHLORIDE 7.45 MG/ML
10 INJECTION INTRAVENOUS PRN
Status: DISCONTINUED | OUTPATIENT
Start: 2021-10-21 | End: 2021-10-26 | Stop reason: HOSPADM

## 2021-10-21 RX ORDER — DEXTROSE MONOHYDRATE 25 G/50ML
12.5 INJECTION, SOLUTION INTRAVENOUS PRN
Status: DISCONTINUED | OUTPATIENT
Start: 2021-10-21 | End: 2021-10-26 | Stop reason: HOSPADM

## 2021-10-21 RX ORDER — ONDANSETRON 4 MG/1
4 TABLET, ORALLY DISINTEGRATING ORAL EVERY 8 HOURS PRN
Status: DISCONTINUED | OUTPATIENT
Start: 2021-10-21 | End: 2021-10-26 | Stop reason: HOSPADM

## 2021-10-21 RX ORDER — DEXTROSE MONOHYDRATE 50 MG/ML
100 INJECTION, SOLUTION INTRAVENOUS PRN
Status: DISCONTINUED | OUTPATIENT
Start: 2021-10-21 | End: 2021-10-26 | Stop reason: HOSPADM

## 2021-10-21 RX ORDER — MAGNESIUM SULFATE 1 G/100ML
1000 INJECTION INTRAVENOUS PRN
Status: DISCONTINUED | OUTPATIENT
Start: 2021-10-21 | End: 2021-10-26 | Stop reason: HOSPADM

## 2021-10-21 RX ORDER — ACETAMINOPHEN 650 MG/1
650 SUPPOSITORY RECTAL EVERY 6 HOURS PRN
Status: DISCONTINUED | OUTPATIENT
Start: 2021-10-21 | End: 2021-10-26 | Stop reason: HOSPADM

## 2021-10-21 RX ORDER — ONDANSETRON 2 MG/ML
4 INJECTION INTRAMUSCULAR; INTRAVENOUS EVERY 6 HOURS PRN
Status: DISCONTINUED | OUTPATIENT
Start: 2021-10-21 | End: 2021-10-26 | Stop reason: HOSPADM

## 2021-10-21 RX ORDER — ACETAMINOPHEN 325 MG/1
650 TABLET ORAL EVERY 6 HOURS PRN
Status: DISCONTINUED | OUTPATIENT
Start: 2021-10-21 | End: 2021-10-26 | Stop reason: HOSPADM

## 2021-10-21 RX ADMIN — VANCOMYCIN HYDROCHLORIDE 2000 MG: 1 INJECTION, POWDER, LYOPHILIZED, FOR SOLUTION INTRAVENOUS at 22:00

## 2021-10-21 RX ADMIN — PIPERACILLIN AND TAZOBACTAM 3375 MG: 3; .375 INJECTION, POWDER, FOR SOLUTION INTRAVENOUS at 21:07

## 2021-10-21 RX ADMIN — SODIUM CHLORIDE, PRESERVATIVE FREE 10 ML: 5 INJECTION INTRAVENOUS at 23:34

## 2021-10-21 RX ADMIN — SODIUM CHLORIDE: 9 INJECTION, SOLUTION INTRAVENOUS at 23:33

## 2021-10-21 ASSESSMENT — ENCOUNTER SYMPTOMS
ALLERGIC/IMMUNOLOGIC COMMENTS: SULFA ANTIBIOTICS
SHORTNESS OF BREATH: 0
ABDOMINAL PAIN: 0
TACHYPNEA: 1
COLOR CHANGE: 1

## 2021-10-21 NOTE — ED PROVIDER NOTES
[facility] ED  Emergency Department Encounter  EmergencyMedicine Resident     Pt Name:Omar Guzman  MRN: [id number]  [place] 1940  Date of evaluation: 10/21/21  PCP:  Jimbo Marin MD    This patient was evaluated in the Emergency Department for symptoms described in the history of present illness. The patient was evaluated in the context of the global COVID-19 pandemic, which necessitated consideration that the patient might be at risk for infection with the SARS-CoV-2 virus that causes COVID-19. Institutional protocols and algorithms that pertain to the evaluation of patients at risk for COVID-19 are in a state of rapid change based on information released by regulatory bodies including the CDC and federal and state organizations. These policies and algorithms were followed during the patient's care in the ED. CHIEF COMPLAINT       Chief Complaint   Patient presents with    Wound Check     HISTORY OF PRESENT ILLNESS  (Location/Symptom, Timing/Onset, Context/Setting, Quality, Duration, Modifying Factors, Severity.)      Ashleigh Clayton is a 78 y.o. male who presents with right foot wound x 3 days. Patient has a long history of leg length discrepancy with the right leg being longer after a femur fracture. He does not wear recommended shoes for this condition and sometimes has ulcerations on the plantar aspect of his feet. He has had multiple toe amputations for this issue and osteomyelitis requiring IV antibiotics. Patient reports that the foot is sore around the wound that he has had for the last 3 days and that he also has peripheral neuropathy. He denies seeing a vascular surgeon or having any vascular work-up in the past. Per chart review, was seen at a wound care clinic today at [facility] wound care and his foot was debrided and dressed. The physician there recommended he be admitted for an MRI of his foot and IV antibiotics.     PAST MEDICAL / SURGICAL / SOCIAL / FAMILY HISTORY      has a past medical history of Arm fracture, Back injury, Eczema, Femur fracture (HCC), GERD (gastroesophageal reflux disease), Glaucoma, Hypertension, Idiopathic peripheral neuropathy, MRSA (methicillin resistant staph aureus) culture positive, MSSA (methicillin susceptible Staphylococcus aureus), Osteomyelitis ([facility] Shoulder fracture, and Ulcerated, foot ([facility] has a past surgical history that includes back surgery; hip surgery; and Colonoscopy. Social History     Socioeconomic History    Marital status:      Spouse name: Not on file    Number of children: Not on file    Years of education: Not on file    Highest education level: Not on file   Occupational History    Not on file   Tobacco Use    Smoking status: Never Smoker    Smokeless tobacco: Never Used   Substance and Sexual Activity    Alcohol use: No    Drug use: No    Sexual activity: Not on file   Other Topics Concern    Not on file   Social History Narrative    Not on file     Social Determinants of Health     Financial Resource Strain:     Difficulty of Paying Living Expenses:    Food Insecurity:     Worried About Running Out of Food in the Last Year:     [address] in the Last Year:    Transportation Needs:     Lack of Transportation (Medical):  Lack of Transportation (Non-Medical):    Physical Activity:     Days of Exercise per Week:     Minutes of Exercise per Session:    Stress:     Feeling of Stress :    Social Connections:     Frequency of Communication with Friends and Family:     Frequency of Social Gatherings with Friends and Family:     Attends [employer] Services:     Active Member of Clubs or Organizations:     Attends Club or Organization Meetings:     Marital Status:    Intimate Partner Violence:     Fear of Current or Ex-Partner:     Emotionally Abused:     Physically Abused:     Sexually Abused:        No family history on file.     Allergies:  Sulfa antibiotics    Home Medications:  Prior to Admission medications    Medication Sig Start Date End Date Taking? Authorizing Provider   cephALEXin (KEFLEX) 500 MG capsule  3/14/18   Historical Provider, MD   doxycycline hyclate (VIBRAMYCIN) 100 MG capsule Take 1 capsule by mouth 2 times daily 2/22/18   Historical Provider, MD   ciprofloxacin (CIPRO) 500 MG tablet Take 1 tablet by mouth daily 2/16/18   Historical Provider, MD   EUCRISA 2 % OINT Apply 1 Applicatorful topically as needed 12/1/17   Historical Provider, MD   ibuprofen (ADVIL;MOTRIN) 800 MG tablet Take 800 mg by mouth every 6 hours as needed for Pain    Historical Provider, MD       REVIEW OF SYSTEMS    (2-9 systems for level 4, 10 or more for level 5)      Review of Systems   Constitutional: Positive for activity change. Negative for fever. Respiratory: Negative for shortness of breath. Cardiovascular: Negative for chest pain. Gastrointestinal: Negative for abdominal pain. Musculoskeletal: Positive for joint swelling. Skin: Positive for color change and wound. Allergic/Immunologic:        Sulfa antibiotics   Neurological: Positive for numbness. Negative for weakness. Hematological: Does not bruise/bleed easily. Psychiatric/Behavioral: Negative for confusion. PHYSICAL EXAM   (up to 7 for level 4, 8 or more for level 5)      INITIAL VITALS:   BP (!) 163/86   Pulse 87   Temp 97.6 °F (36.4 °C) (Oral)   Resp 22   Ht 6' 1\" (1.854 m)   Wt 214 lb (97.1 kg)   SpO2 99%   BMI 28.23 kg/m²     Physical Exam  Constitutional:       General: He is not in acute distress. HENT:      Head: Normocephalic and atraumatic. Nose: No congestion. Mouth/Throat:      Mouth: Mucous membranes are moist.   Eyes:      General: No scleral icterus. Right eye: No discharge. Left eye: No discharge. Cardiovascular:      Rate and Rhythm: Normal rate. Pulmonary:      Effort: Pulmonary effort is normal.   Abdominal:      General: Abdomen is flat.  There is Platelet Estimate NOT REPORTED     Immature Granulocytes 0 0 %    Seg Neutrophils 69 (H) 36 - 66 %    Lymphocytes 19 (L) 24 - 44 %    Monocytes 10 (H) 1 - 7 %    Eosinophils % 2 1 - 4 %    Basophils 0 0 - 2 %    Absolute Immature Granulocyte 0.00 0.00 - 0.30 k/uL    Segs Absolute 10.69 (H) 1.8 - 7.7 k/uL    Absolute Lymph # 2.95 1.0 - 4.8 k/uL    Absolute Mono # 1.55 (H) 0.1 - 0.8 k/uL    Absolute Eos # 0.31 0.0 - 0.4 k/uL    Basophils Absolute 0.00 0.0 - 0.2 k/uL    Morphology Normal    BASIC METABOLIC PANEL   Result Value Ref Range    Glucose 97 70 - 99 mg/dL    BUN 20 8 - 23 mg/dL    CREATININE 0.97 0.70 - 1.20 mg/dL    Bun/Cre Ratio NOT REPORTED 9 - 20    Calcium 8.9 8.6 - 10.4 mg/dL    Sodium 137 135 - 144 mmol/L    Potassium 4.2 3.7 - 5.3 mmol/L    Chloride 104 98 - 107 mmol/L    CO2 22 20 - 31 mmol/L    Anion Gap 11 9 - 17 mmol/L    GFR Non-African American >60 >60 mL/min    GFR African American >60 >60 mL/min    GFR Comment          GFR Staging NOT REPORTED    Lactate, Sepsis   Result Value Ref Range    Lactic Acid, Sepsis NOT REPORTED 0.5 - 1.9 mmol/L    Lactic Acid, Sepsis, Whole Blood 1.4 0.5 - 1.9 mmol/L       IMPRESSION: leukocytosis, no lactic acidosis    RADIOLOGY:  XR FOOT RIGHT (MIN 3 VIEWS)    Result Date: 10/21/2021  EXAMINATION: THREE XRAY VIEWS OF THE RIGHT FOOT 10/21/2021 5:09 pm COMPARISON: None. HISTORY: ORDERING SYSTEM PROVIDED HISTORY: osteomyelitis TECHNOLOGIST PROVIDED HISTORY: osteomyelitis FINDINGS: Status post amputation of the 1st metatarsal and phalanges. Osteotomy defect involving the 2nd metatarsophalangeal joint. Transmetatarsal amputation of the 3rd digit. Osteolytic destructive lesion involving the 4th metatarsophalangeal joint with soft tissue swelling. Also evidence of bony destruction of the distal 2nd phalanx. .  There is no evidence of fracture or dislocation. . The remaining joint spaces appear well maintained. Soft tissue swelling. Calcaneal spurs. .     Cellulitis

## 2021-10-22 ENCOUNTER — APPOINTMENT (OUTPATIENT)
Dept: MRI IMAGING | Age: 81
DRG: 617 | End: 2021-10-22
Payer: MEDICARE

## 2021-10-22 PROBLEM — I10 BENIGN ESSENTIAL HTN: Status: ACTIVE | Noted: 2021-10-22

## 2021-10-22 LAB
ANION GAP SERPL CALCULATED.3IONS-SCNC: 15 MMOL/L (ref 9–17)
BUN BLDV-MCNC: 16 MG/DL (ref 8–23)
BUN/CREAT BLD: NORMAL (ref 9–20)
CALCIUM SERPL-MCNC: 9 MG/DL (ref 8.6–10.4)
CHLORIDE BLD-SCNC: 103 MMOL/L (ref 98–107)
CO2: 20 MMOL/L (ref 20–31)
CREAT SERPL-MCNC: 0.96 MG/DL (ref 0.7–1.2)
ESTIMATED AVERAGE GLUCOSE: 128 MG/DL
GFR AFRICAN AMERICAN: >60 ML/MIN
GFR NON-AFRICAN AMERICAN: >60 ML/MIN
GFR SERPL CREATININE-BSD FRML MDRD: NORMAL ML/MIN/{1.73_M2}
GFR SERPL CREATININE-BSD FRML MDRD: NORMAL ML/MIN/{1.73_M2}
GLUCOSE BLD-MCNC: 111 MG/DL (ref 75–110)
GLUCOSE BLD-MCNC: 139 MG/DL (ref 75–110)
GLUCOSE BLD-MCNC: 143 MG/DL (ref 75–110)
GLUCOSE BLD-MCNC: 97 MG/DL (ref 70–99)
HBA1C MFR BLD: 6.1 % (ref 4–6)
HCT VFR BLD CALC: 41.7 % (ref 40.7–50.3)
HEMOGLOBIN: 13.1 G/DL (ref 13–17)
MCH RBC QN AUTO: 28.8 PG (ref 25.2–33.5)
MCHC RBC AUTO-ENTMCNC: 31.4 G/DL (ref 28.4–34.8)
MCV RBC AUTO: 91.6 FL (ref 82.6–102.9)
NRBC AUTOMATED: 0 PER 100 WBC
PDW BLD-RTO: 13.8 % (ref 11.8–14.4)
PLATELET # BLD: 426 K/UL (ref 138–453)
PMV BLD AUTO: 9.4 FL (ref 8.1–13.5)
POTASSIUM SERPL-SCNC: 3.9 MMOL/L (ref 3.7–5.3)
RBC # BLD: 4.55 M/UL (ref 4.21–5.77)
SODIUM BLD-SCNC: 138 MMOL/L (ref 135–144)
WBC # BLD: 12.6 K/UL (ref 3.5–11.3)

## 2021-10-22 PROCEDURE — 87185 SC STD ENZYME DETCJ PER NZM: CPT

## 2021-10-22 PROCEDURE — 6370000000 HC RX 637 (ALT 250 FOR IP): Performed by: INTERNAL MEDICINE

## 2021-10-22 PROCEDURE — 87075 CULTR BACTERIA EXCEPT BLOOD: CPT

## 2021-10-22 PROCEDURE — 85027 COMPLETE CBC AUTOMATED: CPT

## 2021-10-22 PROCEDURE — A9579 GAD-BASE MR CONTRAST NOS,1ML: HCPCS

## 2021-10-22 PROCEDURE — 87076 CULTURE ANAEROBE IDENT EACH: CPT

## 2021-10-22 PROCEDURE — 6360000002 HC RX W HCPCS: Performed by: NURSE PRACTITIONER

## 2021-10-22 PROCEDURE — 2580000003 HC RX 258: Performed by: NURSE PRACTITIONER

## 2021-10-22 PROCEDURE — 1200000000 HC SEMI PRIVATE

## 2021-10-22 PROCEDURE — 83036 HEMOGLOBIN GLYCOSYLATED A1C: CPT

## 2021-10-22 PROCEDURE — 6360000002 HC RX W HCPCS: Performed by: STUDENT IN AN ORGANIZED HEALTH CARE EDUCATION/TRAINING PROGRAM

## 2021-10-22 PROCEDURE — 93923 UPR/LXTR ART STDY 3+ LVLS: CPT

## 2021-10-22 PROCEDURE — 36415 COLL VENOUS BLD VENIPUNCTURE: CPT

## 2021-10-22 PROCEDURE — 73720 MRI LWR EXTREMITY W/O&W/DYE: CPT

## 2021-10-22 PROCEDURE — 99232 SBSQ HOSP IP/OBS MODERATE 35: CPT | Performed by: INTERNAL MEDICINE

## 2021-10-22 PROCEDURE — 82947 ASSAY GLUCOSE BLOOD QUANT: CPT

## 2021-10-22 PROCEDURE — 86403 PARTICLE AGGLUT ANTBDY SCRN: CPT

## 2021-10-22 PROCEDURE — 80048 BASIC METABOLIC PNL TOTAL CA: CPT

## 2021-10-22 PROCEDURE — 2580000003 HC RX 258: Performed by: STUDENT IN AN ORGANIZED HEALTH CARE EDUCATION/TRAINING PROGRAM

## 2021-10-22 PROCEDURE — 87070 CULTURE OTHR SPECIMN AEROBIC: CPT

## 2021-10-22 PROCEDURE — 6360000004 HC RX CONTRAST MEDICATION

## 2021-10-22 PROCEDURE — 99221 1ST HOSP IP/OBS SF/LOW 40: CPT | Performed by: INTERNAL MEDICINE

## 2021-10-22 PROCEDURE — 87186 SC STD MICRODIL/AGAR DIL: CPT

## 2021-10-22 PROCEDURE — 87205 SMEAR GRAM STAIN: CPT

## 2021-10-22 RX ORDER — SODIUM CHLORIDE 0.9 % (FLUSH) 0.9 %
10 SYRINGE (ML) INJECTION PRN
Status: DISCONTINUED | OUTPATIENT
Start: 2021-10-22 | End: 2021-10-26 | Stop reason: HOSPADM

## 2021-10-22 RX ORDER — CARVEDILOL 6.25 MG/1
6.25 TABLET ORAL 2 TIMES DAILY WITH MEALS
Status: DISCONTINUED | OUTPATIENT
Start: 2021-10-22 | End: 2021-10-23

## 2021-10-22 RX ORDER — VALSARTAN 80 MG/1
80 TABLET ORAL DAILY
Status: DISCONTINUED | OUTPATIENT
Start: 2021-10-22 | End: 2021-10-26 | Stop reason: HOSPADM

## 2021-10-22 RX ADMIN — PIPERACILLIN AND TAZOBACTAM 3375 MG: 3; .375 INJECTION, POWDER, FOR SOLUTION INTRAVENOUS at 12:02

## 2021-10-22 RX ADMIN — VANCOMYCIN HYDROCHLORIDE 1500 MG: 10 INJECTION, POWDER, LYOPHILIZED, FOR SOLUTION INTRAVENOUS at 20:21

## 2021-10-22 RX ADMIN — PIPERACILLIN AND TAZOBACTAM 3375 MG: 3; .375 INJECTION, POWDER, FOR SOLUTION INTRAVENOUS at 20:21

## 2021-10-22 RX ADMIN — PIPERACILLIN AND TAZOBACTAM 3375 MG: 3; .375 INJECTION, POWDER, FOR SOLUTION INTRAVENOUS at 03:00

## 2021-10-22 RX ADMIN — SODIUM CHLORIDE, PRESERVATIVE FREE 10 ML: 5 INJECTION INTRAVENOUS at 11:19

## 2021-10-22 RX ADMIN — GADOTERIDOL 18 ML: 279.3 INJECTION, SOLUTION INTRAVENOUS at 17:46

## 2021-10-22 RX ADMIN — CARVEDILOL 6.25 MG: 12.5 TABLET, FILM COATED ORAL at 18:10

## 2021-10-22 RX ADMIN — CARVEDILOL 6.25 MG: 12.5 TABLET, FILM COATED ORAL at 11:06

## 2021-10-22 RX ADMIN — VALSARTAN 80 MG: 80 TABLET, FILM COATED ORAL at 11:06

## 2021-10-22 RX ADMIN — SODIUM CHLORIDE, PRESERVATIVE FREE 20 ML: 5 INJECTION INTRAVENOUS at 20:50

## 2021-10-22 RX ADMIN — ENOXAPARIN SODIUM 40 MG: 40 INJECTION SUBCUTANEOUS at 11:08

## 2021-10-22 ASSESSMENT — ENCOUNTER SYMPTOMS
SHORTNESS OF BREATH: 0
NAUSEA: 0
GASTROINTESTINAL NEGATIVE: 1
ABDOMINAL PAIN: 0
COLOR CHANGE: 1
RESPIRATORY NEGATIVE: 1
CONSTIPATION: 0
DIARRHEA: 0
VOMITING: 0
CHEST TIGHTNESS: 0
ALLERGIC/IMMUNOLOGIC NEGATIVE: 1
EYES NEGATIVE: 1

## 2021-10-22 ASSESSMENT — PAIN SCALES - GENERAL: PAINLEVEL_OUTOF10: 0

## 2021-10-22 NOTE — PROGRESS NOTES
presents to the ER with a plantar aspect right foot wound that has been ongoing over the last 3 days. Patient had initial visit today at [facility] [address] [place] [address] who recommended admission for MRI and antibiotics. He denies any CP, SOB, ABD pain, n/v/d, HA/dizziness, cough or fevers. Imaging demonstrates cellulitis with acute osteomyelitis of the 4th metatarsophalangeal joint and adjacent bony structures and acute/chronic osteomyelitis of the distal 2nd phalanx. Patient was started on broad spectrum IV antibiotics and Podiatry was was consulted from the ER. Patient is admitted to [facility] for continued management. Review of Systems:     Constitutional:  negative for chills, fevers, sweats  Respiratory:  negative for cough, dyspnea on exertion, shortness of breath, wheezing  Cardiovascular:  negative for chest pain, chest pressure/discomfort, lower extremity edema, palpitations  Gastrointestinal:  negative for abdominal pain, constipation, diarrhea, nausea, vomiting  Neurological:  negative for dizziness, headache    Medications: Allergies:     Allergies   Allergen Reactions    Sulfa Antibiotics        Current Meds:   Scheduled Meds:    valsartan  80 mg Oral Daily    carvedilol  6.25 mg Oral BID WC    sodium chloride flush  5-40 mL IntraVENous 2 times per day    enoxaparin  40 mg SubCUTAneous Daily    piperacillin-tazobactam  3,375 mg IntraVENous Q8H    vancomycin (VANCOCIN) intermittent dosing (placeholder)   Other RX Placeholder    vancomycin  1,500 mg IntraVENous Q24H     Continuous Infusions:    sodium chloride      dextrose       PRN Meds: sodium chloride flush, sodium chloride, potassium chloride **OR** potassium alternative oral replacement **OR** potassium chloride, magnesium sulfate, ondansetron **OR** ondansetron, polyethylene glycol, acetaminophen **OR** acetaminophen, glucose, dextrose, glucagon (rDNA), dextrose    Data:     Past Medical History:   has a past medical history of Arm fracture, Back injury, Eczema, Femur fracture (HCC), GERD (gastroesophageal reflux disease), Glaucoma, Hypertension, Idiopathic peripheral neuropathy, MRSA (methicillin resistant staph aureus) culture positive, MSSA (methicillin susceptible Staphylococcus aureus), Osteomyelitis ([address] Shoulder fracture, and Ulcerated, foot ([facility] Social History:   reports that he has never smoked. He has never used smokeless tobacco. He reports that he does not drink alcohol and does not use drugs. Family History: No family history on file. Vitals:  /73   Pulse 80   Temp 97.5 °F (36.4 °C) (Oral)   Resp 16   Ht 6' 1\" (1.854 m)   Wt 214 lb (97.1 kg)   SpO2 98%   BMI 28.23 kg/m²   Temp (24hrs), Av.6 °F (36.4 °C), Min:97.2 °F (36.2 °C), Max:98.1 °F (36.7 °C)    Recent Labs     10/21/21  2337 10/22/21  0755 10/22/21  1156   POCGLU 127* 111* 139*       I/O (24Hr):     Intake/Output Summary (Last 24 hours) at 10/22/2021 1426  Last data filed at 10/22/2021 0441  Gross per 24 hour   Intake 1045 ml   Output 900 ml   Net 145 ml       Labs:  Hematology:  Recent Labs     10/21/21  1930 10/22/21  0540   WBC 15.5* 12.6*   RBC 4.66 4.55   HGB 13.4 13.1   HCT 41.3 41.7   MCV 88.6 91.6   MCH 28.8 28.8   MCHC 32.4 31.4   RDW 13.9 13.8   * 426   MPV 9.2 9.4     Chemistry:  Recent Labs     10/21/21  1930 10/22/21  0540    138   K 4.2 3.9    103   CO2 22 20   GLUCOSE 97 97   BUN 20 16   CREATININE 0.97 0.96   ANIONGAP 11 15   LABGLOM >60 >60   GFRAA >60 >60   CALCIUM 8.9 9.0     Recent Labs     10/21/21  2337 10/22/21  0540 10/22/21  0755 10/22/21  1156   LABA1C  --  6.1*  --   --    POCGLU 127*  --  111* 139*     ABG:No results found for: POCPH, PHART, PH, POCPCO2, VVV2MUV, PCO2, POCPO2, PO2ART, PO2, POCHCO3, BLE7ZRX, HCO3, NBEA, PBEA, BEART, BE, THGBART, THB, SRX9WKV, TWKN5QYE, S8XIUSOD, O2SAT, FIO2  Lab Results   Component Value Date/Time    SPECIAL NOT REPORTED 10/21/2021 08:34 PM     Lab Results   Component Value Date/Time    CULTURE PENDING 10/21/2021 08:34 PM       Radiology:  XR FOOT RIGHT (MIN 3 VIEWS)    Result Date: 10/21/2021  Cellulitis with associated acute osteomyelitis involving the 4th metatarsophalangeal joint/adjacent bony structures. Acute or chronic osteomyelitis of the distal 2nd phalanx. Physical Examination:        General appearance:  alert, cooperative and no distress  Mental Status:  oriented to person, place and time and normal affect  Lungs:  clear to auscultation bilaterally, normal effort  Heart:  regular rate and rhythm, no murmur  Abdomen:  soft, nontender, nondistended, normal bowel sounds, no masses, hepatomegaly, splenomegaly  Extremities:  no edema, redness, tenderness in the calves  Skin:  no gross lesions, rashes, induration    Assessment:        Hospital Problems         Last Modified POA    * (Principal) Osteomyelitis of right foot ([address] 10/22/2021 Yes    Benign essential HTN 10/22/2021 Yes          Plan:        1. Osteomyelitis of the right footconsult infectious disease and podiatry. Continue vancomycin and Zosyn. Likely will need long-term antibiotics. This is his third toe amputation for osteomyelitis. Likely from pressure ulcers which get infected due to history of bilateral hip fractures.   2. Essential hypertensioncontinue home medications    Andria Vinson DO  10/22/2021  2:26 PM

## 2021-10-22 NOTE — CONSULTS
[facility] - Initial Consult Note  Today's Date and Time: 10/22/2021, 6:08 PM    Impression :   1. Right foot deep plantar ulceration extending to the fourth MTP joint with septic fourth MTP joint and osteomyelitis of the fourth metatarsal and proximal phalanx of the fourth toe  2. Diabetes mellitus with associated neuropathy  3. Gastroesophageal reflux disease    Recommendations:   · Continue intravenous antimicrobial therapy with Zosyn and vancomycin. · The patient is status post transmetatarsal amputation of the right foot. · Given that the site of infection has been removed in its entirety the patient should likely be okay to discharge on oral antimicrobial therapy in the next 48-72 hours     Medical Decision Making/Summary/Discussion:10/22/2021     ·   Infection Control Recommendations   · Greer Precautions  Antimicrobial Stewardship Recommendations     · Targeted therapy  · IV to oral conversion  · PK dosing    Coordination of Outpatient Care:   · Estimated Length of IV antimicrobials:  · Patient will need Midline Catheter Insertion:   · Patient will need PICC line Insertion:  · Patient will need: Home IV , [place],  SNF,  LTAC: TBD  · Patient will need outpatient wound care:    Chief complaint/reason for consultation:   · Diabetic foot infection      History of Present Illness:   Tawnya Toledo is a 78y.o.-year-old  male who was initially admitted on 10/21/2021. Patient seen at the request of Dr. Ashly Borja:    Patient is an 78-year-old male who has a history of diabetes mellitus with associated neuropathy, gastroesophageal reflux disease, hypertension, prior bouts of osteomyelitis, bilateral hip fractures requiring open reduction internal fixation and he does have a limb length discrepancy right greater than left which has led him to get calluses and ulcerations.     The patient has a chronic right foot ulceration and he had a debrided at the wound care center and it was recommended that he be admitted for an MRI of his foot to evaluate for osteomyelitis and to be treated with IV antibiotic therapy. The patient had an MRI of the foot that showed a deep ulceration extending to the fourth MTP joint with septic fourth MTP joint and osteomyelitis of the entirety of the fourth metatarsal and proximal phalanx of the fourth toe. There were associated destructive changes of the fourth metatarsal head and neck.     The patient is to be taken to the operating room for transmetatarsal amputation of the right foot with Achilles tendon lengthening of the right lower extremity and debridement of nonviable tissue and bone in the right foot on 10-23-21     We are asked to evaluate and help with antibiotic choice. Labs, X rays reviewed: 10/22/2021    BUN:  Cr:    WBC:  Hb:  Plat:     Cultures:  Urine:  ·   Blood:  ·   Sputum :  ·   Wound:  ·     Discussed with patient, RN, family. I have personally reviewed the past medical history, past surgical history, medications, social history, and family history, and I have updated the database accordingly. Past Medical History:     Past Medical History:   Diagnosis Date    Arm fracture     Back injury     Eczema     Femur fracture (HCC)     GERD (gastroesophageal reflux disease)     Glaucoma     Hypertension     Idiopathic peripheral neuropathy 9/25/2017    MRSA (methicillin resistant staph aureus) culture positive 04/11/2018    foot    MSSA (methicillin susceptible Staphylococcus aureus)     Osteomyelitis ([address] 04/2018    right foot.  Unable to treat with IV antibiotics, pt out of town on family matter    Shoulder fracture     Ulcerated, foot ([facility]     wound       Past Surgical  History:     Past Surgical History:   Procedure Laterality Date    BACK SURGERY      COLONOSCOPY      HIP SURGERY      ORIF left hip       Medications:      valsartan  80 mg Oral Daily    carvedilol  6.25 mg Oral BID WC    sodium chloride flush  5-40 mL IntraVENous 2 times per day    enoxaparin  40 mg SubCUTAneous Daily    piperacillin-tazobactam  3,375 mg IntraVENous Q8H    vancomycin (VANCOCIN) intermittent dosing (placeholder)   Other RX Placeholder    vancomycin  1,500 mg IntraVENous Q24H       Social History:     Social History     Socioeconomic History    Marital status:      Spouse name: Not on file    Number of children: Not on file    Years of education: Not on file    Highest education level: Not on file   Occupational History    Not on file   Tobacco Use    Smoking status: Never Smoker    Smokeless tobacco: Never Used   Substance and Sexual Activity    Alcohol use: No    Drug use: No    Sexual activity: Not on file   Other Topics Concern    Not on file   Social History Narrative    Not on file     Social Determinants of Health     Financial Resource Strain:     Difficulty of Paying Living Expenses:    Food Insecurity:     Worried About Running Out of Food in the Last Year:     [address] in the Last Year:    Transportation Needs:     Lack of Transportation (Medical):  Lack of Transportation (Non-Medical):    Physical Activity:     Days of Exercise per Week:     Minutes of Exercise per Session:    Stress:     Feeling of Stress :    Social Connections:     Frequency of Communication with Friends and Family:     Frequency of Social Gatherings with Friends and Family:     Attends [employer] Services:     Active Member of Clubs or Organizations:     Attends Club or Organization Meetings:     Marital Status:    Intimate Partner Violence:     Fear of Current or Ex-Partner:     Emotionally Abused:     Physically Abused:     Sexually Abused:        Family History:   No family history on file. Allergies:   Sulfa antibiotics     Review of Systems:   Constitutional: No fevers or chills. No systemic complaints  Head: No headaches  Eyes: No double vision or blurry vision.  No conjunctival inflammation. ENT: No sore throat or runny nose. . No hearing loss, tinnitus or vertigo. Cardiovascular: No chest pain or palpitations. No shortness of breath. No ZABALA  Lung: No shortness of breath or cough. No sputum production  Abdomen: No nausea, vomiting, diarrhea, or abdominal pain. Monroe Copping No cramps. Genitourinary: No increased urinary frequency, or dysuria. No hematuria. No suprapubic or CVA pain  Musculoskeletal: No muscle aches or pains. No joint effusions, swelling or deformities. Rt TMA  Hematologic: No bleeding or bruising. Neurologic: No headache, weakness, numbness, or tingling. Integument: No rash, no ulcers. Psychiatric: No depression. Endocrine: No polyuria, no polydipsia, no polyphagia. Physical Examination :     Patient Vitals for the past 8 hrs:   Height   10/22/21 1549 6' 1\" (1.854 m)     General Appearance: Awake, alert, and in no apparent distress  Head:  Normocephalic, no trauma  Eyes: Pupils equal, round, reactive to light and accommodation; extraocular movements intact; sclera anicteric; conjunctivae pink. No embolic phenomena. ENT: Oropharynx clear, without erythema, exudate, or thrush. No tenderness of sinuses. Mouth/throat: mucosa pink and moist. No lesions. Dentition in good repair. Neck:Supple, without lymphadenopathy. Thyroid normal, No bruits. Pulmonary/Chest: Clear to auscultation, without wheezes, rales, or rhonchi. No dullness to percussion. Cardiovascular: Regular rate and rhythm without murmurs, rubs, or gallops. Abdomen: Soft, non tender. Bowel sounds normal. No organomegaly  All four Extremities: No cyanosis, clubbing, edema, or effusions. Rt TMA  Neurologic: No gross sensory or motor deficits. Skin: Warm and dry with good turgor. Signs of peripheral arterial insufficiency.  Rt TMA    Medical Decision Making -Laboratory:   I have independently reviewed/ordered the following labs:    CBC with Differential:   Recent Labs     10/21/21  1930 10/22/21  5034 WBC 15.5* 12.6*   HGB 13.4 13.1   HCT 41.3 41.7   * 426   LYMPHOPCT 19*  --    MONOPCT 10*  --      BMP:   Recent Labs     10/21/21  1930 10/22/21  0540    138   K 4.2 3.9    103   CO2 22 20   BUN 20 16   CREATININE 0.97 0.96     Hepatic Function Panel: No results for input(s): PROT, LABALBU, BILIDIR, IBILI, BILITOT, ALKPHOS, ALT, AST in the last 72 hours. No results for input(s): RPR in the last 72 hours. No results for input(s): HIV in the last 72 hours. No results for input(s): BC in the last 72 hours. Lab Results   Component Value Date    RBC 4.55 10/22/2021    WBC 12.6 10/22/2021     Lab Results   Component Value Date    CREATININE 0.96 10/22/2021    GLUCOSE 97 10/22/2021       Medical Decision Making-Imaging:       Medical Decision Ccpgpr-Esdasozf-Suhvc:     Component Collected Lab   Specimen Description 10/22/2021 12:25  [address]   . WOUND RIGHT    Special Requests 10/22/2021 12:25 AM [address]    Direct Exam Abnormal  10/22/2021 12:25  [address]    Direct Exam Abnormal  10/22/2021 12:25 AM [address] NEGATIVE RODS    Direct Exam Abnormal  10/22/2021 12:25  [address]   FEW GRAM POSITIVE COCCOBACILLI    Culture Abnormal  10/22/2021 12:25  [address] RESISTANT STAPHYLOCOCCUS AUREUS SCANT GROWTH    Culture 10/22/2021 12:25  [address]    Culture Abnormal  10/22/2021 12:25  [address]   BACTEROIDES FRAGILIS BETA LACTAMASE POSITIVE LIGHT GROWTH    Testing Performed By        Medical Decision Making-Other:     Note:  · Labs, medications, radiologic studies were reviewed with personal review of films  · Moderate to Large amounts of data were reviewed  · Discussed with nursing Staff, Discharge planner  · Infection Control and Prevention measures reviewed  · All prior entries were reviewed  · Administer medications as ordered  · Prognosis: Fair  · Discharge planning reviewed  · Follow up as outpatient. Thank you for allowing us to participate in the care of this patient. Please call with questions.     Omar Vegas MD  Pager: [phone] - Office: [phone]

## 2021-10-22 NOTE — ED NOTES
Writer spoke with pt's son Cosmo Gonzalez who lives in [place]. Pt's son states that patient that has had issues with short term memory for approximately two years. Pt's son states that he has been trying to get patient to move in with him but pt is stubborn and will not leave [place] despite not having anyone near him. Pt told writer the same story multiple times. Pt asked orientation questions and pt responded president is Zhanna Najera. Pt alert and orriented x3. Resident notified.           Petar Urrutia RN  10/21/21 1466

## 2021-10-22 NOTE — PLAN OF CARE
The following physicians are managing this wound podiatric surgeons. ET Nurse will defer wound care to the physician services    Please re-consult by ordering Wound Ostomy Eval and Treat if future wound, skin, or ostomy care assistance is needed.

## 2021-10-22 NOTE — H&P
[facility]  Office: [address], DO, Harshaleena Curry, DO, Jazmyne Perkins, DO, Ivett Lou Keyla, DO, Bibiana Kline MD, Lisandro Arceo MD, Bernardo Ren MD, Jer Tellez MD, Blossom Duke MD, Galdino Acosta MD, Eulalia Jordan MD, Rigoberto Garcia MD, [place] Officer, DO, Mich Molina, DO, Kj Leonardo MD,  March , DO, Maryam Castillo MD, Eduard Spears MD, Yoan Wharton MD, Roxie Perez MD, Nahed Xie MD, Kenan Epperson MD, Tereza Murillo, [facility], [facility], CNP, Melanie Barry, CNP, Ricardo Gibbs, CNS, Nydia Peters, [facility], Hank Parsons, CNP, Andrea Colon, CNP, Ronny Reid, CNP, Gearline Precise, CNP, Cinthia Bonner, CNP, Lobo Velazco PA-C, Nancy Rodriguez, [facility], Monika Kraus, CNP, Zenaida Jose, CNP, Amy Lay, CNP, Terri Mercer, CNP, Brenda Pickett, CNP, Nick Saleh, CNP, Dionte Santos, [address]    HISTORY AND PHYSICAL EXAMINATION            Date:   10/22/2021  Patient name:  Shayla Jeter  Date of admission:  10/21/2021  6:35 PM  MRN:   [id number]  Account:  [de-identified]  YOB: 1940  PCP:    Jose Ball MD  Room:   8372/3365-33  Code Status:    Full Code    Chief Complaint:     Chief Complaint   Patient presents with    Wound Check       History Obtained From:     patient, electronic medical record    History of Present Illness:     Shayla Jeter is a 78 y.o. Non- / non  male who presents with Wound Check   and is admitted to the hospital for the management of Osteomyelitis of right foot ([facility] [address] This is a very pleasant 78 yr old male with underlying history of HTN,leg length discrepancy after a fall/femur fracture and chronic foot wounds with history of osteomyelitis and subsequent amputations who presents to the ER with a plantar aspect right foot wound that has been ongoing over the last 3 days.  Patient had initial visit today at [address] [place] [address] who recommended admission for MRI and antibiotics. He denies any CP, SOB, ABD pain, n/v/d, HA/dizziness, cough or fevers. Imaging demonstrates cellulitis with acute osteomyelitis of the 4th metatarsophalangeal joint and adjacent bony structures and acute/chronic osteomyelitis of the distal 2nd phalanx. Patient was started on broad spectrum IV antibiotics and Podiatry was was consulted from the ER. Patient is admitted to [facility] for continued management. Past Medical History:     Past Medical History:   Diagnosis Date    Arm fracture     Back injury     Eczema     Femur fracture (HCC)     GERD (gastroesophageal reflux disease)     Glaucoma     Hypertension     Idiopathic peripheral neuropathy 9/25/2017    MRSA (methicillin resistant staph aureus) culture positive 04/11/2018    foot    MSSA (methicillin susceptible Staphylococcus aureus)     Osteomyelitis ([facility] [address] 04/2018    right foot. Unable to treat with IV antibiotics, pt out of town on family matter    Shoulder fracture     Ulcerated, foot ([facility] [address]     wound        Past Surgical History:     Past Surgical History:   Procedure Laterality Date    BACK SURGERY      COLONOSCOPY      HIP SURGERY      ORIF left hip        Medications Prior to Admission:     Prior to Admission medications    Medication Sig Start Date End Date Taking?  Authorizing Provider   cephALEXin (KEFLEX) 500 MG capsule  3/14/18   Historical Provider, MD   doxycycline hyclate (VIBRAMYCIN) 100 MG capsule Take 1 capsule by mouth 2 times daily 2/22/18   Historical Provider, MD   ciprofloxacin (CIPRO) 500 MG tablet Take 1 tablet by mouth daily 2/16/18   Historical Provider, MD   EUCRISA 2 % OINT Apply 1 Applicatorful topically as needed 12/1/17   Historical Provider, MD   ibuprofen (ADVIL;MOTRIN) 800 MG tablet Take 800 mg by mouth every 6 hours as needed for Pain    Historical Provider, MD        Allergies:     Sulfa antibiotics    Social History:     Tobacco:    reports that he has never smoked. He has never used smokeless tobacco.  Alcohol:      reports no history of alcohol use. Drug Use:  reports no history of drug use. Family History:     No family history on file. Review of Systems:     Positive and Negative as described in HPI. Review of Systems   Constitutional: Negative. HENT: Negative. Eyes: Negative. Respiratory: Negative. Cardiovascular: Negative. Gastrointestinal: Negative. Endocrine: Negative. Genitourinary: Negative. Musculoskeletal: Positive for gait problem. Leg length discrepancy post fall/hip fracture   Skin: Positive for color change and wound. RLE foot ulceration with surrounding skin changes erythema and pruritis   Allergic/Immunologic: Negative. Psychiatric/Behavioral: Negative. Physical Exam:   BP (!) 163/86   Pulse 87   Temp 97.6 °F (36.4 °C) (Oral)   Resp 22   Ht 6' 1\" (1.854 m)   Wt 214 lb (97.1 kg)   SpO2 99%   BMI 28.23 kg/m²   Temp (24hrs), Av.6 °F (36.4 °C), Min:97.6 °F (36.4 °C), Max:97.6 °F (36.4 °C)    Recent Labs     10/21/21  2337   POCGLU 127*     No intake or output data in the 24 hours ending 10/22/21 0048    Physical Exam  Vitals and nursing note reviewed. Constitutional:       General: He is not in acute distress. Appearance: Normal appearance. He is not ill-appearing or diaphoretic. HENT:      Head: Normocephalic and atraumatic. Right Ear: External ear normal.      Left Ear: External ear normal.      Nose: Nose normal. No congestion or rhinorrhea. Mouth/Throat:      Mouth: Mucous membranes are dry. Pharynx: Oropharynx is clear. Eyes:      Extraocular Movements: Extraocular movements intact. Conjunctiva/sclera: Conjunctivae normal.      Pupils: Pupils are equal, round, and reactive to light. Cardiovascular:      Rate and Rhythm: Normal rate and regular rhythm. Pulses: Normal pulses. Heart sounds: Normal heart sounds. No murmur heard.    No friction rub. No gallop. Pulmonary:      Effort: Pulmonary effort is normal. No respiratory distress. Breath sounds: Normal breath sounds. No wheezing, rhonchi or rales. Abdominal:      General: Bowel sounds are normal. There is no distension. Palpations: Abdomen is soft. There is no mass. Tenderness: There is no abdominal tenderness. Musculoskeletal:         General: Swelling and tenderness present. Cervical back: Normal range of motion and neck supple. No rigidity or tenderness. Right lower leg: Edema present. Skin:     General: Skin is warm and dry. Capillary Refill: Capillary refill takes less than 2 seconds. Findings: Erythema present. Neurological:      General: No focal deficit present. Mental Status: He is alert and oriented to person, place, and time. Mental status is at baseline. Cranial Nerves: No cranial nerve deficit. Sensory: No sensory deficit. Motor: No weakness. Psychiatric:         Mood and Affect: Mood normal.         Behavior: Behavior normal.         Thought Content:  Thought content normal.         Judgment: Judgment normal.         Investigations:      Laboratory Testing:  Recent Results (from the past 24 hour(s))   CBC Auto Differential    Collection Time: 10/21/21  7:30 PM   Result Value Ref Range    WBC 15.5 (H) 3.5 - 11.3 k/uL    RBC 4.66 4.21 - 5.77 m/uL    Hemoglobin 13.4 13.0 - 17.0 g/dL    Hematocrit 41.3 40.7 - 50.3 %    MCV 88.6 82.6 - 102.9 fL    MCH 28.8 25.2 - 33.5 pg    MCHC 32.4 28.4 - 34.8 g/dL    RDW 13.9 11.8 - 14.4 %    Platelets 782 (H) 390 - 453 k/uL    MPV 9.2 8.1 - 13.5 fL    NRBC Automated 0.0 0.0 per 100 WBC    Differential Type NOT REPORTED     WBC Morphology NOT REPORTED     RBC Morphology NOT REPORTED     Platelet Estimate NOT REPORTED     Immature Granulocytes 0 0 %    Seg Neutrophils 69 (H) 36 - 66 %    Lymphocytes 19 (L) 24 - 44 %    Monocytes 10 (H) 1 - 7 %    Eosinophils % 2 1 - 4 %    Basophils 0 0 - 2 Plan:     Patient status inpatient in the Med/Surge    1. Right foot osteomyelitis/Cellulitis: Continue broad spectrum antibiotics and follow cultures, Podiatry consulted and await further recommendations/plans  2. HTN: controlled, resume home meds  3. DVT ptophylaxis  4. Full Code    Consultations:   IP CONSULT TO PODIATRY  PHARMACY TO DOSE VANCOMYCIN  IP CONSULT TO HOSPITALIST  PHARMACY TO DOSE VANCOMYCIN    Patient is admitted as inpatient status because of co-morbidities listed above, severity of signs and symptoms as outlined, requirement for current medical therapies and most importantly because of direct risk to patient if care not provided in a hospital setting. Expected length of stay > 48 hours.     Alton Augustine, APRN - NP  10/22/2021  12:48 AM    Copy sent to Dr. Conchita Hatch MD

## 2021-10-22 NOTE — PROGRESS NOTES
Pharmacy Note  Vancomycin Consult    Erwin Alberts is a 78 y.o. male started on Vancomycin for SSTI; consult received from Dr. Polo Beltran to manage therapy. Also receiving the following antibiotics: zosyn. Patient Active Problem List   Diagnosis    Chronic osteomyelitis of right foot with draining sinus (HCC)    Idiopathic peripheral neuropathy    Mild protein malnutrition (HCC)    Foot osteomyelitis, right ([address]    Open wound of right foot    Open wound of right foot     Allergies:  Sulfa antibiotics     Temp max: 97.6    Recent Labs     10/21/21  1930   BUN 20   CREATININE 0.97   WBC 15.5*     No intake or output data in the 24 hours ending 10/21/21 2032  Culture Date      Source                       Results  See micro    Ht Readings from Last 1 Encounters:   10/21/21 6' 1\" (1.854 m)        Wt Readings from Last 1 Encounters:   10/21/21 214 lb (97.1 kg)       Body mass index is 28.23 kg/m². Estimated Creatinine Clearance: 71 mL/min (based on SCr of 0.97 mg/dL). Goal Trough Level: 14-17 mcg/mL    Assessment/Plan:  Will initiate Vancomycin with a one time loading dose of 2000 mg x1, followed by 1500 mg IV every 24 hours. Timing of trough level will be determined based on culture results, renal function, and clinical response. Thank you for the consult. Will continue to follow.     Elizabeth MacielD, BCPS 10/21/2021 8:49 PM

## 2021-10-22 NOTE — PROGRESS NOTES
Comprehensive Nutrition Assessment    Type and Reason for Visit:  Initial, Positive Nutrition Screen (Wound)    Nutrition Recommendations/Plan: Continue current diet. Encourage/monitor intakes as tolerated. Monitor need for oral supplements. Will monitor labs, weights, and plan of care. Nutrition Assessment:  Pt admitted d/t chronic right foot ulceration/wounds. Pt with osteomyelitis of the right foot. Pt with a h/o multiple toe amputations. Pt reports having a good appetite and eating well at meals. Minimal weight history per chart review. Labs reviewed: Hemoglobin A1c 6.1%, Glucose 111-139 mg/dL. Meds reviewed. Malnutrition Assessment:  Malnutrition Status: At risk for malnutrition    Context:  Chronic Illness     Findings of the 6 clinical characteristics of malnutrition:  Energy Intake:  Mild decrease in energy intake  Weight Loss:  Unable to assess     Body Fat Loss:  No significant body fat loss   Muscle Mass Loss:  No significant muscle mass loss  Fluid Accumulation:  No significant fluid accumulation   Strength:  Not Performed    Estimated Daily Nutrient Needs:  Energy (kcal):  20-22 kcal/kg = 5653-0434 kcals/day; Weight Used for Energy Requirements:  Current     Protein (g):  1.1-1.3 gm/kg =  gm pro/day; Weight Used for Protein Requirements:  Ideal        Fluid (ml/day):  25 mL/kg = 2400 mL/day or per MD; Method Used for Fluid Requirements:  ml/Kg      Nutrition Related Findings:  Labs/Meds reviewed. Wounds:  Diabetic Ulcer (to right foot)       Current Nutrition Therapies:    ADULT DIET; Regular; 4 carb choices (60 gm/meal)    Anthropometric Measures:  · Height: 6' 1\" (185.4 cm)  · Current Body Weight: 214 lb (97.1 kg)   · Admission Body Weight: 214 lb (97.1 kg)    · Ideal Body Weight: 184 lbs; % Ideal Body Weight 116.3 %   · BMI: 28.2  · BMI Categories: Overweight (BMI 25.0-29. 9)       Nutrition Diagnosis:   · Increased nutrient needs related to  (healing) as evidenced by wounds    Nutrition Interventions:   Food and/or Nutrient Delivery:  Continue Current Diet (Monitor need for ONS.)  Nutrition Education/Counseling:  No recommendation at this time   Coordination of Nutrition Care:  Continue to monitor while inpatient    Goals:  Oral intakes to meet at least 75% of estimated nutrition needs.        Nutrition Monitoring and Evaluation:   Food/Nutrient Intake Outcomes:  Food and Nutrient Intake  Physical Signs/Symptoms Outcomes:  Biochemical Data, GI Status, Fluid Status or Edema, Hemodynamic Status, Nutrition Focused Physical Findings, Skin, Weight     Electronically signed by Delaney El RD, LD on 10/22/21 at 3:59 PM EDT    Contact: [phone]

## 2021-10-22 NOTE — PROGRESS NOTES
Progress Note  Podiatric Medicine and Surgery     Subjective     CC: right foot wound    Patient seen and examined at bedside. No acute events overnight. Afebrile, vital signs stable  Patient is going down for MRI in the PM       HPI :  Ashleigh Clayton is a 78 y.o. male with chronic right foot ulceration.  Patient states he has a history of limb length discrepancy, R>L.  He does see a podiatrist and has orthotics but does not wear them to help offload and prevent ulcereations. Patient reports has had multiple toe amputations for this issue and osteomyelitis requiring IV antibiotics.  Patient reports that the foot is sore around the wound that he has had for the last 3 days and that he also has peripheral neuropathy.  He denies seeing a vascular surgeon or having any vascular work-up in the past. Per chart review, was seen at a wound care clinic today at [place] [facility] and his foot was debrided and dressed. Per ED note, the physician there recommended he be admitted for an MRI of his foot and IV antibiotics. Patient has PMH of type 2 diabetes mellitus with neuropathy. PCP is Jimbo Marin MD    ROS: Denies N/V/F/C/SOB/CP. Otherwise negative except at stated in the HPI.      Medications:  Scheduled Meds:   sodium chloride flush  5-40 mL IntraVENous 2 times per day    enoxaparin  40 mg SubCUTAneous Daily    piperacillin-tazobactam  3,375 mg IntraVENous Q8H    vancomycin (VANCOCIN) intermittent dosing (placeholder)   Other RX Placeholder    vancomycin  1,500 mg IntraVENous Q24H       Continuous Infusions:   sodium chloride      dextrose         PRN Meds:sodium chloride flush, sodium chloride, potassium chloride **OR** potassium alternative oral replacement **OR** potassium chloride, magnesium sulfate, ondansetron **OR** ondansetron, polyethylene glycol, acetaminophen **OR** acetaminophen, glucose, dextrose, glucagon (rDNA), dextrose    Objective     Vitals:  Patient Vitals for the past 8 hrs:   BP Temp Temp src Pulse Resp   10/22/21 0441 (!) 149/82 98.1 °F (36.7 °C) Oral 78 16   10/21/21 2337 (!) 150/67 97.2 °F (36.2 °C) Oral 86 18     Average, Min, and Max for last 24 hours Vitals:  TEMPERATURE:  Temp  Av.6 °F (36.4 °C)  Min: 97.2 °F (36.2 °C)  Max: 98.1 °F (36.7 °C)    RESPIRATIONS RANGE: Resp  Av.7  Min: 16  Max: 22    PULSE RANGE: Pulse  Av.7  Min: 78  Max: 87    BLOOD PRESSURE RANGE:  Systolic (29IRT), ECS:648 , Min:149 , NQX:001   ; Diastolic (10PCP), XZK:53, Min:67, Max:86      PULSE OXIMETRY RANGE: SpO2  Av %  Min: 99 %  Max: 99 %    No intake/output data recorded. CBC:  Recent Labs     10/21/21  1930   WBC 15.5*   HGB 13.4   HCT 41.3   *        BMP:  Recent Labs     10/21/21  1930 10/22/21  0540    138   K 4.2 3.9    103   CO2 22 20   BUN 20 16   CREATININE 0.97 0.96   GLUCOSE 97 97   CALCIUM 8.9 9.0        Coags:  No results for input(s): APTT, PROT, INR in the last 72 hours. No results found for: SEDRATE  No results for input(s): CRP in the last 72 hours. Lower Extremity Physical Exam:  Vascular: DP and PT pulses are non-palpable, bilaterally. CFT <4 seconds to all digits, bilaterally. Hair growth is absent to the level of the digits, bilaterally. Mild, nonpitting edema noted to right foot with increase in associated warmth       Neuro: Saph/sural/SP/DP/plantar sensation absent to light touch.     Musculoskeletal: Muscle strength is 4/5 to all lower extremity muscle groups. Gross deformity is present to right foot with amputations of 1st, 3rd digits. No tenderness on palpation of right foot     Dermatologic: Full thickness ulceration down to level of bone located submetatarsal 4 and measures approximately 1.5 cm x 2.0 cm x 1.0 cm. Base is fibronecrotic. Periwound skin is macerated. There is purulent drainage at today's exam. Erythema with associated increase in warmth. Does probe to bone with mild undermining proximal-medially.  No fluctuance, crepitus, or induration. Interdigital maceration absent. Clinical Images:          Imaging:   XR FOOT RIGHT (MIN 3 VIEWS)   Final Result   Cellulitis with associated acute osteomyelitis involving the 4th   metatarsophalangeal joint/adjacent bony structures. Acute or chronic   osteomyelitis of the distal 2nd phalanx. MRI FOOT RIGHT W WO CONTRAST    (Results Pending)       Cultures: obtained     Assessment   Maikol Reyna is a 78 y.o. male with   1. Diabetic foot infection, right foot  2. Osteomyelitis  3. Cellulitis, right foot  4. Limb length discrepancy  5. PVD  6. Type 2 diabetes mellitus with polyneuropathy    Principal Problem:    Osteomyelitis of right foot (HCC)  Active Problems:    Benign essential HTN  Resolved Problems:    * No resolved hospital problems. *       Plan     · Patient examined and evaluated at bedside   · Treatment options discussed in detail with the patient  · Xrays reviewed - cortical destruction noted to distal 4th metatarsal consistent with osteomyelitic changes  · MRI pending  · PVR result pending  · Culture obtained  · IV abx: Vanc/Zosyn  · ID consulted. Appreciate recommendation.   · Medical management per internal medicine  · Dressing applied to right foot: 1/4 inch packing, betadine around border, DSD, ACE bandage  · NWB to right lower extremity in surgical shoe  · Will discuss with Renu Edwards 26   Podiatric Medicine & Surgery   10/22/2021 at 6:45 AM

## 2021-10-22 NOTE — CARE COORDINATION
Case Management Initial Discharge Plan  Francy Hines,             Met with:patient to discuss discharge plans. Information verified: address, contacts, phone number, , insurance Yes  Insurance Provider: Emma Mariscal    Emergency Contact/Next of Kin name & number: bill  Who are involved in patient's support system? son    PCP: Isaiah De La Cruz MD  Date of last visit: several months ago      Discharge Planning    Living Arrangements:  Spouse/Significant Other, [address] has 2 stories  Location of bedroom 2nd/bathroom in home 1st & 2nd    Patient able to perform ADL's:Independent    Current Services (outpatient & in home) none      Is patient receiving oral anticoagulation therapy? No      Potential Assistance Needed:  F/u pcp     Evaluation: no    Expected Discharge date:  10/24/21    Patient expects to be discharged to:   home        Follow Up Appointment: Best Day/ Time: Monday AM    Transportation provider: self  Transportation arrangements needed for discharge: no    Readmission Risk              Risk of Unplanned Readmission:  6             Does patient have a readmission risk score greater than 14?: No  If yes, follow-up appointment must be made within 7 days of discharge.      Goals of Care: safe transition plan       Educated yes on transitional options, provided freedom of choice and are agreeable with plan      Discharge Plan: return home await ID antibiotic rec's on zoysn/vanco, referral to [facility]           Electronically signed by Nino Mcburney, RN on 10/22/21 at 6:50 PM EDT

## 2021-10-22 NOTE — CONSULTS
Consultation Note  Podiatric Medicine and Surgery     Subjective     Chief Complaint: right foot wound    HPI:  Jeffrey Brody is a 78 y.o. male with chronic right foot ulceration. Patient states he has a history of limb length discrepancy, R>L. He does see a podiatrist and has orthotics but does not wear them to help offload and prevent ulcereations. Patient reports has had multiple toe amputations for this issue and osteomyelitis requiring IV antibiotics. Patient reports that the foot is sore around the wound that he has had for the last 3 days and that he also has peripheral neuropathy. He denies seeing a vascular surgeon or having any vascular work-up in the past. Per chart review, was seen at a wound care clinic today at [facility] wound care and his foot was debrided and dressed. Per ED note, the physician there recommended he be admitted for an MRI of his foot and IV antibiotics. Patient has PMH of type 2 diabetes mellitus with neuropathy. PCP is Juan Alberto Koehler MD    ROS:   Review of Systems   Constitutional: Negative. Negative for chills and fever. Respiratory: Negative. Negative for chest tightness and shortness of breath. Cardiovascular: Negative for chest pain and leg swelling. Gastrointestinal: Negative for abdominal pain, constipation, diarrhea, nausea and vomiting. Musculoskeletal: Positive for myalgias. Negative for arthralgias, gait problem and joint swelling. Skin: Positive for color change and wound. Neurological: Negative for weakness and numbness. Past Medical History   has a past medical history of Arm fracture, Back injury, Eczema, Femur fracture ([facility]), GERD (gastroesophageal reflux disease), Glaucoma, Hypertension, Idiopathic peripheral neuropathy, MRSA (methicillin resistant staph aureus) culture positive, MSSA (methicillin susceptible Staphylococcus aureus), Osteomyelitis ([address] Shoulder fracture, and Ulcerated, foot ([facility] [address]     Past Surgical History   has a past surgical history that includes back surgery; hip surgery; and Colonoscopy. Medications  Prior to Admission medications    Medication Sig Start Date End Date Taking? Authorizing Provider   cephALEXin (KEFLEX) 500 MG capsule  3/14/18   Historical Provider, MD   doxycycline hyclate (VIBRAMYCIN) 100 MG capsule Take 1 capsule by mouth 2 times daily 2/22/18   Historical Provider, MD   ciprofloxacin (CIPRO) 500 MG tablet Take 1 tablet by mouth daily 2/16/18   Historical Provider, MD   EUCRISA 2 % OINT Apply 1 Applicatorful topically as needed 12/1/17   Historical Provider, MD   ibuprofen (ADVIL;MOTRIN) 800 MG tablet Take 800 mg by mouth every 6 hours as needed for Pain    Historical Provider, MD    Scheduled Meds:   sodium chloride flush  5-40 mL IntraVENous 2 times per day    enoxaparin  40 mg SubCUTAneous Daily    insulin lispro  0-6 Units SubCUTAneous TID WC    insulin lispro  0-3 Units SubCUTAneous Nightly    piperacillin-tazobactam  3,375 mg IntraVENous Q8H    vancomycin (VANCOCIN) intermittent dosing (placeholder)   Other RX Placeholder    vancomycin  1,500 mg IntraVENous Q24H     Continuous Infusions:   sodium chloride 75 mL/hr at 10/21/21 2333    sodium chloride      dextrose       PRN Meds:.sodium chloride flush, sodium chloride, potassium chloride **OR** potassium alternative oral replacement **OR** potassium chloride, magnesium sulfate, ondansetron **OR** ondansetron, polyethylene glycol, acetaminophen **OR** acetaminophen, glucose, dextrose, glucagon (rDNA), dextrose    Allergies  is allergic to sulfa antibiotics. Family History  family history is not on file. Social History   reports that he has never smoked. He has never used smokeless tobacco.   reports no history of alcohol use. reports no history of drug use.     Objective     Vitals:  Patient Vitals for the past 8 hrs:   BP Temp Temp src Pulse Resp SpO2 Height Weight   10/21/21 1833 (!) 163/86 97.6 °F (36.4 °C) Oral 87 22 99 % 6' 1\" (1.854 m) 214 lb (97.1 kg)     Average, Min, and Max for last 24 hours Vitals:  TEMPERATURE:  Temp  Av.6 °F (36.4 °C)  Min: 97.6 °F (36.4 °C)  Max: 97.6 °F (36.4 °C)    RESPIRATIONS RANGE: Resp  Av  Min: 22  Max: 22    PULSE RANGE: Pulse  Av  Min: 87  Max: 87    BLOOD PRESSURE RANGE:  Systolic (82MJE), RNR:810 , Min:163 , KIJ:818   ; Diastolic (39QXO), SWK:13, Min:86, Max:86      PULSE OXIMETRY RANGE: SpO2  Av %  Min: 99 %  Max: 99 %  I&O:  No intake/output data recorded. CBC:  Recent Labs     10/21/21  1930   WBC 15.5*   HGB 13.4   HCT 41.3   *        BMP:  Recent Labs     10/21/21  1930      K 4.2      CO2 22   BUN 20   CREATININE 0.97   GLUCOSE 97   CALCIUM 8.9        Coags:  No results for input(s): APTT, PROT, INR in the last 72 hours. Lab Results   Component Value Date    LABA1C 5.7 2017     No results found for: SEDRATE  No results found for: CRP      Lower Extremity Physical Exam:  Vascular: DP and PT pulses are non-palpable, bilaterally. CFT <4 seconds to all digits, bilaterally. Hair growth is absent to the level of the digits, bilaterally. Mild, nonpitting edema noted to right foot with increase in associated warmth      Neuro: Saph/sural/SP/DP/plantar sensation absent to light touch. Musculoskeletal: Muscle strength is 4/5 to all lower extremity muscle groups. Gross deformity is present to right foot with amputations of 1st, 3rd digits. No tenderness on palpation of right foot    Dermatologic: Full thickness ulceration down to level of bone located submetatarsal 4 and measures approximately 1.5 cm x 2.0 cm x 1.0 cm. Base is fibronecrotic. Periwound skin is macerated. No drainage noted with increase in associated mal odor. Erythema with associated increase in warmth. Does probe to bone with mild undermining proximal-medially. No fluctuance, crepitus, or induration. Interdigital maceration absent.      Clinical Images:          Imaging:   XR FOOT RIGHT (MIN 3 VIEWS)   Final Result   Cellulitis with associated acute osteomyelitis involving the 4th   metatarsophalangeal joint/adjacent bony structures. Acute or chronic   osteomyelitis of the distal 2nd phalanx. Cultures: right foot, (10/21/21)    Assessment     Royce Jon is a 78 y.o. male with   1. Diabetic foot infection, right foot  2. Osteomyelitis  3. Cellulitis, right foot  4. Limb length discrepancy  5. PVD  6. Type 2 diabetes mellitus with polyneuropathy    Principal Problem:    Osteomyelitis of right foot (HCC)  Active Problems:    Benign essential HTN  Resolved Problems:    * No resolved hospital problems.  *        Plan     · Patient examined and evaluated at bedside   · Treatment options discussed in detail with the patient  · Xrays reviewed - cortical destruction noted to distal 4th metatarsal consistent with osteomyelitic changes  · MRI ordered right foot  · IV abx: Vanc/Zosyn  · Medical management per internal medicine  · Dressing applied to right foot: DSD, ACE bandage  · NWB to right lower extremity in surgical shoe  · Will discuss with Dr. Alessandro Berry DPM   Podiatric Medicine & Surgery   10/22/2021 at 12:47 AM

## 2021-10-23 ENCOUNTER — ANESTHESIA (OUTPATIENT)
Dept: OPERATING ROOM | Age: 81
DRG: 617 | End: 2021-10-23
Payer: MEDICARE

## 2021-10-23 ENCOUNTER — ANESTHESIA EVENT (OUTPATIENT)
Dept: OPERATING ROOM | Age: 81
DRG: 617 | End: 2021-10-23
Payer: MEDICARE

## 2021-10-23 VITALS — OXYGEN SATURATION: 100 % | DIASTOLIC BLOOD PRESSURE: 58 MMHG | TEMPERATURE: 69.9 F | SYSTOLIC BLOOD PRESSURE: 104 MMHG

## 2021-10-23 LAB
EKG ATRIAL RATE: 77 BPM
EKG P AXIS: 41 DEGREES
EKG P-R INTERVAL: 186 MS
EKG Q-T INTERVAL: 380 MS
EKG QRS DURATION: 78 MS
EKG QTC CALCULATION (BAZETT): 430 MS
EKG R AXIS: -20 DEGREES
EKG T AXIS: 37 DEGREES
EKG VENTRICULAR RATE: 77 BPM
GLUCOSE BLD-MCNC: 112 MG/DL (ref 75–110)
VANCOMYCIN RANDOM DATE LAST DOSE: NORMAL
VANCOMYCIN RANDOM DOSE AMOUNT: NORMAL
VANCOMYCIN RANDOM TIME LAST DOSE: NORMAL
VANCOMYCIN RANDOM: 14.1 UG/ML

## 2021-10-23 PROCEDURE — 6370000000 HC RX 637 (ALT 250 FOR IP): Performed by: NURSE PRACTITIONER

## 2021-10-23 PROCEDURE — 6360000002 HC RX W HCPCS: Performed by: PODIATRIST

## 2021-10-23 PROCEDURE — 3600000004 HC SURGERY LEVEL 4 BASE: Performed by: PODIATRIST

## 2021-10-23 PROCEDURE — 2580000003 HC RX 258: Performed by: PODIATRIST

## 2021-10-23 PROCEDURE — 1200000000 HC SEMI PRIVATE

## 2021-10-23 PROCEDURE — 87075 CULTR BACTERIA EXCEPT BLOOD: CPT

## 2021-10-23 PROCEDURE — 3E0T3BZ INTRODUCTION OF ANESTHETIC AGENT INTO PERIPHERAL NERVES AND PLEXI, PERCUTANEOUS APPROACH: ICD-10-PCS | Performed by: PODIATRIST

## 2021-10-23 PROCEDURE — 93005 ELECTROCARDIOGRAM TRACING: CPT | Performed by: INTERNAL MEDICINE

## 2021-10-23 PROCEDURE — 3600000014 HC SURGERY LEVEL 4 ADDTL 15MIN: Performed by: PODIATRIST

## 2021-10-23 PROCEDURE — 6370000000 HC RX 637 (ALT 250 FOR IP): Performed by: PODIATRIST

## 2021-10-23 PROCEDURE — 2580000003 HC RX 258: Performed by: NURSE PRACTITIONER

## 2021-10-23 PROCEDURE — 87102 FUNGUS ISOLATION CULTURE: CPT

## 2021-10-23 PROCEDURE — 0Y6M0ZD DETACHMENT AT RIGHT FOOT, PARTIAL 4TH RAY, OPEN APPROACH: ICD-10-PCS | Performed by: PODIATRIST

## 2021-10-23 PROCEDURE — 6360000002 HC RX W HCPCS: Performed by: NURSE PRACTITIONER

## 2021-10-23 PROCEDURE — 88304 TISSUE EXAM BY PATHOLOGIST: CPT

## 2021-10-23 PROCEDURE — 86403 PARTICLE AGGLUT ANTBDY SCRN: CPT

## 2021-10-23 PROCEDURE — 88311 DECALCIFY TISSUE: CPT

## 2021-10-23 PROCEDURE — 2580000003 HC RX 258: Performed by: NURSE ANESTHETIST, CERTIFIED REGISTERED

## 2021-10-23 PROCEDURE — 2500000003 HC RX 250 WO HCPCS: Performed by: NURSE ANESTHETIST, CERTIFIED REGISTERED

## 2021-10-23 PROCEDURE — 0Y6M0ZB DETACHMENT AT RIGHT FOOT, PARTIAL 2ND RAY, OPEN APPROACH: ICD-10-PCS | Performed by: PODIATRIST

## 2021-10-23 PROCEDURE — 99232 SBSQ HOSP IP/OBS MODERATE 35: CPT | Performed by: INTERNAL MEDICINE

## 2021-10-23 PROCEDURE — 7100000000 HC PACU RECOVERY - FIRST 15 MIN: Performed by: PODIATRIST

## 2021-10-23 PROCEDURE — 88307 TISSUE EXAM BY PATHOLOGIST: CPT

## 2021-10-23 PROCEDURE — 6370000000 HC RX 637 (ALT 250 FOR IP): Performed by: INTERNAL MEDICINE

## 2021-10-23 PROCEDURE — 3700000000 HC ANESTHESIA ATTENDED CARE: Performed by: PODIATRIST

## 2021-10-23 PROCEDURE — 80202 ASSAY OF VANCOMYCIN: CPT

## 2021-10-23 PROCEDURE — 0L8V0ZZ DIVISION OF RIGHT FOOT TENDON, OPEN APPROACH: ICD-10-PCS | Performed by: PODIATRIST

## 2021-10-23 PROCEDURE — 3700000001 HC ADD 15 MINUTES (ANESTHESIA): Performed by: PODIATRIST

## 2021-10-23 PROCEDURE — 87176 TISSUE HOMOGENIZATION CULTR: CPT

## 2021-10-23 PROCEDURE — 0Y6M0ZC DETACHMENT AT RIGHT FOOT, PARTIAL 3RD RAY, OPEN APPROACH: ICD-10-PCS | Performed by: PODIATRIST

## 2021-10-23 PROCEDURE — 87070 CULTURE OTHR SPECIMN AEROBIC: CPT

## 2021-10-23 PROCEDURE — 82947 ASSAY GLUCOSE BLOOD QUANT: CPT

## 2021-10-23 PROCEDURE — 2709999900 HC NON-CHARGEABLE SUPPLY: Performed by: PODIATRIST

## 2021-10-23 PROCEDURE — 7100000001 HC PACU RECOVERY - ADDTL 15 MIN: Performed by: PODIATRIST

## 2021-10-23 PROCEDURE — 6360000002 HC RX W HCPCS: Performed by: NURSE ANESTHETIST, CERTIFIED REGISTERED

## 2021-10-23 PROCEDURE — 36415 COLL VENOUS BLD VENIPUNCTURE: CPT

## 2021-10-23 PROCEDURE — 2500000003 HC RX 250 WO HCPCS: Performed by: PODIATRIST

## 2021-10-23 RX ORDER — FENTANYL CITRATE 50 UG/ML
25 INJECTION, SOLUTION INTRAMUSCULAR; INTRAVENOUS EVERY 5 MIN PRN
Status: DISCONTINUED | OUTPATIENT
Start: 2021-10-23 | End: 2021-10-23 | Stop reason: HOSPADM

## 2021-10-23 RX ORDER — LIDOCAINE HYDROCHLORIDE 10 MG/ML
INJECTION, SOLUTION EPIDURAL; INFILTRATION; INTRACAUDAL; PERINEURAL PRN
Status: DISCONTINUED | OUTPATIENT
Start: 2021-10-23 | End: 2021-10-23 | Stop reason: ALTCHOICE

## 2021-10-23 RX ORDER — SODIUM CHLORIDE, SODIUM LACTATE, POTASSIUM CHLORIDE, CALCIUM CHLORIDE 600; 310; 30; 20 MG/100ML; MG/100ML; MG/100ML; MG/100ML
INJECTION, SOLUTION INTRAVENOUS CONTINUOUS PRN
Status: DISCONTINUED | OUTPATIENT
Start: 2021-10-23 | End: 2021-10-23 | Stop reason: SDUPTHER

## 2021-10-23 RX ORDER — LIDOCAINE HYDROCHLORIDE 10 MG/ML
INJECTION, SOLUTION EPIDURAL; INFILTRATION; INTRACAUDAL; PERINEURAL PRN
Status: DISCONTINUED | OUTPATIENT
Start: 2021-10-23 | End: 2021-10-23 | Stop reason: SDUPTHER

## 2021-10-23 RX ORDER — SENNA PLUS 8.6 MG/1
2 TABLET ORAL NIGHTLY
Status: DISCONTINUED | OUTPATIENT
Start: 2021-10-23 | End: 2021-10-26 | Stop reason: HOSPADM

## 2021-10-23 RX ORDER — PROPOFOL 10 MG/ML
INJECTION, EMULSION INTRAVENOUS PRN
Status: DISCONTINUED | OUTPATIENT
Start: 2021-10-23 | End: 2021-10-23 | Stop reason: SDUPTHER

## 2021-10-23 RX ORDER — OXYCODONE HYDROCHLORIDE AND ACETAMINOPHEN 5; 325 MG/1; MG/1
1 TABLET ORAL PRN
Status: DISCONTINUED | OUTPATIENT
Start: 2021-10-23 | End: 2021-10-23 | Stop reason: HOSPADM

## 2021-10-23 RX ORDER — SUCCINYLCHOLINE/SOD CL,ISO/PF 100 MG/5ML
SYRINGE (ML) INTRAVENOUS PRN
Status: DISCONTINUED | OUTPATIENT
Start: 2021-10-23 | End: 2021-10-23 | Stop reason: SDUPTHER

## 2021-10-23 RX ORDER — OXYCODONE HYDROCHLORIDE 5 MG/1
5 TABLET ORAL EVERY 4 HOURS PRN
Status: DISCONTINUED | OUTPATIENT
Start: 2021-10-23 | End: 2021-10-26 | Stop reason: HOSPADM

## 2021-10-23 RX ORDER — DEXAMETHASONE SODIUM PHOSPHATE 10 MG/ML
INJECTION INTRAMUSCULAR; INTRAVENOUS PRN
Status: DISCONTINUED | OUTPATIENT
Start: 2021-10-23 | End: 2021-10-23 | Stop reason: SDUPTHER

## 2021-10-23 RX ORDER — BUPIVACAINE HYDROCHLORIDE 5 MG/ML
INJECTION, SOLUTION EPIDURAL; INTRACAUDAL PRN
Status: DISCONTINUED | OUTPATIENT
Start: 2021-10-23 | End: 2021-10-23 | Stop reason: ALTCHOICE

## 2021-10-23 RX ORDER — PHENYLEPHRINE HCL IN 0.9% NACL 1 MG/10 ML
SYRINGE (ML) INTRAVENOUS PRN
Status: DISCONTINUED | OUTPATIENT
Start: 2021-10-23 | End: 2021-10-23 | Stop reason: SDUPTHER

## 2021-10-23 RX ORDER — MORPHINE SULFATE 2 MG/ML
2 INJECTION, SOLUTION INTRAMUSCULAR; INTRAVENOUS EVERY 5 MIN PRN
Status: DISCONTINUED | OUTPATIENT
Start: 2021-10-23 | End: 2021-10-23 | Stop reason: HOSPADM

## 2021-10-23 RX ORDER — MAGNESIUM HYDROXIDE 1200 MG/15ML
LIQUID ORAL CONTINUOUS PRN
Status: COMPLETED | OUTPATIENT
Start: 2021-10-23 | End: 2021-10-23

## 2021-10-23 RX ORDER — EPHEDRINE SULFATE/0.9% NACL/PF 50 MG/5 ML
SYRINGE (ML) INTRAVENOUS PRN
Status: DISCONTINUED | OUTPATIENT
Start: 2021-10-23 | End: 2021-10-23 | Stop reason: SDUPTHER

## 2021-10-23 RX ORDER — OXYCODONE HYDROCHLORIDE 5 MG/1
10 TABLET ORAL EVERY 4 HOURS PRN
Status: DISCONTINUED | OUTPATIENT
Start: 2021-10-23 | End: 2021-10-26 | Stop reason: HOSPADM

## 2021-10-23 RX ORDER — LABETALOL HYDROCHLORIDE 5 MG/ML
5 INJECTION, SOLUTION INTRAVENOUS EVERY 10 MIN PRN
Status: DISCONTINUED | OUTPATIENT
Start: 2021-10-23 | End: 2021-10-23 | Stop reason: HOSPADM

## 2021-10-23 RX ORDER — ONDANSETRON 2 MG/ML
4 INJECTION INTRAMUSCULAR; INTRAVENOUS
Status: DISCONTINUED | OUTPATIENT
Start: 2021-10-23 | End: 2021-10-23 | Stop reason: HOSPADM

## 2021-10-23 RX ORDER — OXYCODONE HYDROCHLORIDE AND ACETAMINOPHEN 5; 325 MG/1; MG/1
2 TABLET ORAL PRN
Status: DISCONTINUED | OUTPATIENT
Start: 2021-10-23 | End: 2021-10-23 | Stop reason: HOSPADM

## 2021-10-23 RX ORDER — ONDANSETRON 2 MG/ML
INJECTION INTRAMUSCULAR; INTRAVENOUS PRN
Status: DISCONTINUED | OUTPATIENT
Start: 2021-10-23 | End: 2021-10-23 | Stop reason: SDUPTHER

## 2021-10-23 RX ORDER — TRAZODONE HYDROCHLORIDE 50 MG/1
50 TABLET ORAL NIGHTLY PRN
Status: DISCONTINUED | OUTPATIENT
Start: 2021-10-23 | End: 2021-10-26

## 2021-10-23 RX ORDER — FENTANYL CITRATE 50 UG/ML
INJECTION, SOLUTION INTRAMUSCULAR; INTRAVENOUS PRN
Status: DISCONTINUED | OUTPATIENT
Start: 2021-10-23 | End: 2021-10-23 | Stop reason: SDUPTHER

## 2021-10-23 RX ORDER — DIPHENHYDRAMINE HYDROCHLORIDE 50 MG/ML
12.5 INJECTION INTRAMUSCULAR; INTRAVENOUS
Status: DISCONTINUED | OUTPATIENT
Start: 2021-10-23 | End: 2021-10-23 | Stop reason: HOSPADM

## 2021-10-23 RX ORDER — CARVEDILOL 12.5 MG/1
12.5 TABLET ORAL 2 TIMES DAILY WITH MEALS
Status: DISCONTINUED | OUTPATIENT
Start: 2021-10-23 | End: 2021-10-26 | Stop reason: HOSPADM

## 2021-10-23 RX ADMIN — Medication 200 MCG: at 15:07

## 2021-10-23 RX ADMIN — VALSARTAN 80 MG: 80 TABLET, FILM COATED ORAL at 09:24

## 2021-10-23 RX ADMIN — PROPOFOL 200 MG: 10 INJECTION, EMULSION INTRAVENOUS at 14:40

## 2021-10-23 RX ADMIN — VANCOMYCIN HYDROCHLORIDE 1500 MG: 10 INJECTION, POWDER, LYOPHILIZED, FOR SOLUTION INTRAVENOUS at 17:36

## 2021-10-23 RX ADMIN — SENNOSIDES 17.2 MG: 8.6 TABLET, FILM COATED ORAL at 21:23

## 2021-10-23 RX ADMIN — Medication 100 MCG: at 14:50

## 2021-10-23 RX ADMIN — CARVEDILOL 6.25 MG: 12.5 TABLET, FILM COATED ORAL at 09:24

## 2021-10-23 RX ADMIN — ENOXAPARIN SODIUM 40 MG: 40 INJECTION SUBCUTANEOUS at 09:24

## 2021-10-23 RX ADMIN — LIDOCAINE HYDROCHLORIDE 50 MG: 10 INJECTION, SOLUTION EPIDURAL; INFILTRATION; INTRACAUDAL; PERINEURAL at 14:40

## 2021-10-23 RX ADMIN — Medication 100 MCG: at 15:28

## 2021-10-23 RX ADMIN — Medication 200 MCG: at 15:13

## 2021-10-23 RX ADMIN — Medication 100 MCG: at 15:31

## 2021-10-23 RX ADMIN — PIPERACILLIN AND TAZOBACTAM 3375 MG: 3; .375 INJECTION, POWDER, FOR SOLUTION INTRAVENOUS at 20:14

## 2021-10-23 RX ADMIN — PROPOFOL 100 MG: 10 INJECTION, EMULSION INTRAVENOUS at 15:01

## 2021-10-23 RX ADMIN — PIPERACILLIN AND TAZOBACTAM 3375 MG: 3; .375 INJECTION, POWDER, FOR SOLUTION INTRAVENOUS at 04:00

## 2021-10-23 RX ADMIN — Medication 100 MG: at 14:40

## 2021-10-23 RX ADMIN — CARVEDILOL 12.5 MG: 12.5 TABLET, FILM COATED ORAL at 17:35

## 2021-10-23 RX ADMIN — ONDANSETRON 4 MG: 2 INJECTION, SOLUTION INTRAMUSCULAR; INTRAVENOUS at 14:53

## 2021-10-23 RX ADMIN — SODIUM CHLORIDE: 0.9 INJECTION, SOLUTION INTRAVENOUS at 14:40

## 2021-10-23 RX ADMIN — Medication 10 MG: at 15:35

## 2021-10-23 RX ADMIN — PIPERACILLIN AND TAZOBACTAM 3375 MG: 3; .375 INJECTION, POWDER, FOR SOLUTION INTRAVENOUS at 11:53

## 2021-10-23 RX ADMIN — FENTANYL CITRATE 50 MCG: 50 INJECTION, SOLUTION INTRAMUSCULAR; INTRAVENOUS at 15:02

## 2021-10-23 RX ADMIN — FENTANYL CITRATE 50 MCG: 50 INJECTION, SOLUTION INTRAMUSCULAR; INTRAVENOUS at 14:40

## 2021-10-23 RX ADMIN — SODIUM CHLORIDE, POTASSIUM CHLORIDE, SODIUM LACTATE AND CALCIUM CHLORIDE: 600; 310; 30; 20 INJECTION, SOLUTION INTRAVENOUS at 14:45

## 2021-10-23 RX ADMIN — DEXAMETHASONE SODIUM PHOSPHATE 10 MG: 10 INJECTION INTRAMUSCULAR; INTRAVENOUS at 14:53

## 2021-10-23 RX ADMIN — Medication 100 MCG: at 15:19

## 2021-10-23 RX ADMIN — Medication 100 MCG: at 15:23

## 2021-10-23 ASSESSMENT — PULMONARY FUNCTION TESTS
PIF_VALUE: 4
PIF_VALUE: 6
PIF_VALUE: 20
PIF_VALUE: 16
PIF_VALUE: 15
PIF_VALUE: 20
PIF_VALUE: 19
PIF_VALUE: 0
PIF_VALUE: 19
PIF_VALUE: 20
PIF_VALUE: 20
PIF_VALUE: 3
PIF_VALUE: 20
PIF_VALUE: 2
PIF_VALUE: 18
PIF_VALUE: 0
PIF_VALUE: 0
PIF_VALUE: 19
PIF_VALUE: 20
PIF_VALUE: 16
PIF_VALUE: 19
PIF_VALUE: 20
PIF_VALUE: 20
PIF_VALUE: 19
PIF_VALUE: 19
PIF_VALUE: 20
PIF_VALUE: 36
PIF_VALUE: 20
PIF_VALUE: 2
PIF_VALUE: 18
PIF_VALUE: 20
PIF_VALUE: 15
PIF_VALUE: 20
PIF_VALUE: 20
PIF_VALUE: 19
PIF_VALUE: 7
PIF_VALUE: 19
PIF_VALUE: 20
PIF_VALUE: 19
PIF_VALUE: 19
PIF_VALUE: 3
PIF_VALUE: 18
PIF_VALUE: 19
PIF_VALUE: 20
PIF_VALUE: 2
PIF_VALUE: 20
PIF_VALUE: 27
PIF_VALUE: 16
PIF_VALUE: 19
PIF_VALUE: 18
PIF_VALUE: 16
PIF_VALUE: 20
PIF_VALUE: 0
PIF_VALUE: 20
PIF_VALUE: 19
PIF_VALUE: 18
PIF_VALUE: 20
PIF_VALUE: 15
PIF_VALUE: 19
PIF_VALUE: 19
PIF_VALUE: 16
PIF_VALUE: 20
PIF_VALUE: 18
PIF_VALUE: 15
PIF_VALUE: 20
PIF_VALUE: 19
PIF_VALUE: 15
PIF_VALUE: 20
PIF_VALUE: 37
PIF_VALUE: 20
PIF_VALUE: 15
PIF_VALUE: 20
PIF_VALUE: 20
PIF_VALUE: 2
PIF_VALUE: 19
PIF_VALUE: 20
PIF_VALUE: 21
PIF_VALUE: 15
PIF_VALUE: 20
PIF_VALUE: 20
PIF_VALUE: 19

## 2021-10-23 ASSESSMENT — PAIN SCALES - GENERAL
PAINLEVEL_OUTOF10: 0

## 2021-10-23 ASSESSMENT — PAIN - FUNCTIONAL ASSESSMENT: PAIN_FUNCTIONAL_ASSESSMENT: 0-10

## 2021-10-23 NOTE — ANESTHESIA PRE PROCEDURE
Department of Anesthesiology  Preprocedure Note       Name:  Fidel Ocampo   Age:  78 y.o.  :  1940                                          MRN:  [id number]         Date:  10/23/2021      Surgeon: Genia Engle):  Anamaria Hernadez DPM    Procedure: Procedure(s):  TRANSMETATARSAL AMPUTATION RIGHT FOOT  POSSIBLE ACHILLES TENDON LENGTHENING REPAIR    Department of Anesthesiology  Pre-Anesthesia Evaluation/Consultation         Name:  Fidel Ocampo                                         Age:  78 y.o.   MRN:  [id number]             Medications  Current Facility-Administered Medications   Medication Dose Route Frequency Provider Last Rate Last Admin    traZODone (DESYREL) tablet 50 mg  50 mg Oral Nightly PRN Pita Rodrigues DO        vancomycin (VANCOCIN) 1500 mg in dextrose 5 % 250 mL IVPB  1,500 mg IntraVENous Q18H Tori Lemus DO        carvedilol (COREG) tablet 12.5 mg  12.5 mg Oral BID WC Pita Rodrigues DO        valsartan (DIOVAN) tablet 80 mg  80 mg Oral Daily Pita Rodrigues DO   80 mg at 10/23/21 5982    sodium chloride flush 0.9 % injection 10 mL  10 mL IntraVENous PRN JADA DoughertyM        sodium chloride flush 0.9 % injection 5-40 mL  5-40 mL IntraVENous 2 times per day [place] Bors, APRN - CNP   20 mL at 10/22/21 2050    sodium chloride flush 0.9 % injection 10 mL  10 mL IntraVENous PRN [place] Bors, APRN - CNP        0.9 % sodium chloride infusion  25 mL IntraVENous PRN Leslie Bors, APRN - CNP        potassium chloride (KLOR-CON M) extended release tablet 40 mEq  40 mEq Oral PRN Leslie Bors, APRN - CNP        Or    potassium bicarbonate (K-LYTE) disintegrating tablet 50 mEq  50 mEq Oral PRN Leslie Bors, APRN - CNP        Or    potassium chloride 10 mEq/100 mL IVPB (Peripheral Line)  10 mEq IntraVENous PRN Leslie Bors, APRN - CNP        magnesium sulfate 1000 mg in dextrose 5% 100 mL IVPB  1,000 mg IntraVENous PRN Leslie Bors, APRN - CNP        enoxaparin (LOVENOX) injection 40 mg  40 mg SubCUTAneous Daily Shelvy Ching, APRN - CNP   40 mg at 10/23/21 8782    ondansetron (ZOFRAN-ODT) disintegrating tablet 4 mg  4 mg Oral Q8H PRN Shelvy Ching, APRN - CNP        Or    ondansetron [facility]) injection 4 mg  4 mg IntraVENous Q6H PRN Shelvy Ching, APRN - CNP        polyethylene glycol [facility]) packet 17 g  17 g Oral Daily PRN Shelvy Ching, APRN - CNP        acetaminophen (TYLENOL) tablet 650 mg  650 mg Oral Q6H PRN Shelvy Ching, APRN - CNP        Or    acetaminophen (TYLENOL) suppository 650 mg  650 mg Rectal Q6H PRN Shelvy Ching, APRN - CNP        glucose (GLUTOSE) 40 % oral gel 15 g  15 g Oral PRN Shelvy Ching, APRN - CNP        dextrose 50 % IV solution  12.5 g IntraVENous PRN Shelvy Ching, APRN - CNP        glucagon (rDNA) injection 1 mg  1 mg IntraMUSCular PRN Shelvy Ching, APRN - CNP        dextrose 5 % solution  100 mL/hr IntraVENous PRN Shelvy Ching, APRN - CNP        piperacillin-tazobactam (ZOSYN) 3,375 mg in dextrose 5 % 50 mL IVPB extended infusion (mini-bag)  3,375 mg IntraVENous Q8H Shelvy Ching, APRN - CNP 12.5 mL/hr at 10/23/21 1153 3,375 mg at 10/23/21 1153    vancomycin (VANCOCIN) intermittent dosing (placeholder)   Other RX Placeholder Pamila Mohini, DO           Allergies   Allergen Reactions    Sulfa Antibiotics      Patient Active Problem List   Diagnosis    Chronic osteomyelitis of right foot with draining sinus (HCC)    Idiopathic peripheral neuropathy    Mild protein malnutrition (HCC)    Foot osteomyelitis, right ([address]    Open wound of right foot    Open wound of right foot    Osteomyelitis of right foot (HCC)    Benign essential HTN     Past Medical History:   Diagnosis Date    Arm fracture     Back injury     Eczema     [facility] [address] [address]     GERD (gastroesophageal reflux disease)     Glaucoma     Hypertension     Idiopathic peripheral neuropathy 9/25/2017    MRSA (methicillin resistant staph aureus) culture positive 04/11/2018    foot 04/11/2018    CALCIUM 9.0 10/22/2021    BILITOT 0.22 04/11/2018    ALKPHOS 84 04/11/2018    AST 16 04/11/2018    ALT 14 04/11/2018       BMP    Lab Results   Component Value Date     10/22/2021    K 3.9 10/22/2021     10/22/2021    CO2 20 10/22/2021    BUN 16 10/22/2021    CREATININE 0.96 10/22/2021    CALCIUM 9.0 10/22/2021    GFRAA >60 10/22/2021    LABGLOM >60 10/22/2021    GLUCOSE 97 10/22/2021       POC Testing  Recent Labs     10/23/21  0811   POCGLU 112*       Coags  No results found for: PROTIME, INR, APTT    HCG (If Applicable) No results found for: PREGTESTUR, PREGSERUM, HCG, HCGQUANT     ABGs No results found for: PHART, PO2ART, UKD0MIC, ZHC1ELT, BEART, S4BJUEOA     Type & Screen (If Applicable)  No results found for: [facility]    Radiology (If Applicable)    Cardiac Testing (If Applicable) EF 64%    EKG (If Applicable) sinus arrthymia          Medications prior to admission:   Prior to Admission medications    Medication Sig Start Date End Date Taking?  Authorizing Provider   cephALEXin (KEFLEX) 500 MG capsule  3/14/18   Historical Provider, MD   doxycycline hyclate (VIBRAMYCIN) 100 MG capsule Take 1 capsule by mouth 2 times daily 2/22/18   Historical Provider, MD   ciprofloxacin (CIPRO) 500 MG tablet Take 1 tablet by mouth daily 2/16/18   Historical Provider, MD   EUCRISA 2 % OINT Apply 1 Applicatorful topically as needed 12/1/17   Historical Provider, MD   ibuprofen (ADVIL;MOTRIN) 800 MG tablet Take 800 mg by mouth every 6 hours as needed for Pain    Historical Provider, MD       Current medications:    Current Facility-Administered Medications   Medication Dose Route Frequency Provider Last Rate Last Admin    traZODone (DESYREL) tablet 50 mg  50 mg Oral Nightly PRN Rosie Civatte, DO        vancomycin (VANCOCIN) 1500 mg in dextrose 5 % 250 mL IVPB  1,500 mg IntraVENous Q18H Marya Teixeira DO        carvedilol (COREG) tablet 12.5 mg  12.5 mg Oral BID WC Rosie Civatte, DO        valsartan (DIOVAN) tablet 80 mg  80 mg Oral Daily Yoli Javed DO   80 mg at 10/23/21 6710    sodium chloride flush 0.9 % injection 10 mL  10 mL IntraVENous PRN Tiffany Huggins, DPM        sodium chloride flush 0.9 % injection 5-40 mL  5-40 mL IntraVENous 2 times per day Selina Belinda, APRN - CNP   20 mL at 10/22/21 2050    sodium chloride flush 0.9 % injection 10 mL  10 mL IntraVENous PRN Selina Belinda, APRN - CNP        0.9 % sodium chloride infusion  25 mL IntraVENous PRN Selina Movico, APRN - CNP        potassium chloride (KLOR-CON M) extended release tablet 40 mEq  40 mEq Oral PRN Selina Movico, APRN - CNP        Or    potassium bicarbonate (K-LYTE) disintegrating tablet 50 mEq  50 mEq Oral PRN Selina Movico, APRN - CNP        Or    potassium chloride 10 mEq/100 mL IVPB (Peripheral Line)  10 mEq IntraVENous PRN Selina Movico, APRN - CNP        magnesium sulfate 1000 mg in dextrose 5% 100 mL IVPB  1,000 mg IntraVENous PRN Selina Movico, APRN - CNP        enoxaparin (LOVENOX) injection 40 mg  40 mg SubCUTAneous Daily Selina Belinda, APRN - CNP   40 mg at 10/23/21 8388    ondansetron (ZOFRAN-ODT) disintegrating tablet 4 mg  4 mg Oral Q8H PRN Selina Movico, APRN - CNP        Or    ondansetron [facility]) injection 4 mg  4 mg IntraVENous Q6H PRN Selina Belinda, APRN - CNP        polyethylene glycol (GLYCOLAX) packet 17 g  17 g Oral Daily PRN Selina Movico, APRN - CNP        acetaminophen (TYLENOL) tablet 650 mg  650 mg Oral Q6H PRN Selina Movico, APRN - CNP        Or    acetaminophen (TYLENOL) suppository 650 mg  650 mg Rectal Q6H PRN Selina Movico, APRN - CNP        glucose (GLUTOSE) 40 % oral gel 15 g  15 g Oral PRN Selina Movico, APRN - CNP        dextrose 50 % IV solution  12.5 g IntraVENous PRN Selina Movico, APRN - CNP        glucagon (rDNA) injection 1 mg  1 mg IntraMUSCular PRN Selina Belinda, APRN - CNP        dextrose 5 % solution  100 mL/hr IntraVENous PRN Selina Trevino, APRN - CNP        98%     Weight: 215 lb 6.2 oz (97.7 kg)      Height:                                                  BP Readings from Last 3 Encounters:   10/23/21 (!) 149/87   05/17/18 (!) 179/90   04/11/18 (!) 176/94       NPO Status:  freely eating candy                                Breakfast at 9 AM                                               BMI:   Wt Readings from Last 3 Encounters:   10/23/21 215 lb 6.2 oz (97.7 kg)   05/17/18 232 lb (105.2 kg)   04/11/18 231 lb (104.8 kg)     Body mass index is 28.42 kg/m².     CBC:   Lab Results   Component Value Date    WBC 12.6 10/22/2021    RBC 4.55 10/22/2021    HGB 13.1 10/22/2021    HCT 41.7 10/22/2021    MCV 91.6 10/22/2021    RDW 13.8 10/22/2021     10/22/2021       CMP:   Lab Results   Component Value Date     10/22/2021    K 3.9 10/22/2021     10/22/2021    CO2 20 10/22/2021    BUN 16 10/22/2021    CREATININE 0.96 10/22/2021    GFRAA >60 10/22/2021    LABGLOM >60 10/22/2021    GLUCOSE 97 10/22/2021    PROT 7.5 04/11/2018    CALCIUM 9.0 10/22/2021    BILITOT 0.22 04/11/2018    ALKPHOS 84 04/11/2018    AST 16 04/11/2018    ALT 14 04/11/2018       POC Tests:   Recent Labs     10/23/21  0811   POCGLU 112*       Coags: No results found for: PROTIME, INR, APTT    HCG (If Applicable): No results found for: PREGTESTUR, PREGSERUM, HCG, HCGQUANT     ABGs: No results found for: PHART, PO2ART, NMJ4PGG, YGM0PRX, BEART, J2NHFTPF     Type & Screen (If Applicable):  No results found for: LABABO, LABRH    Drug/Infectious Status (If Applicable):  No results found for: HIV, HEPCAB    COVID-19 Screening (If Applicable): No results found for: COVID19        Anesthesia Evaluation    Airway: Mallampati: III     Neck ROM: full   Dental:          Pulmonary:       (-) recent URI                           Cardiovascular:  Exercise tolerance: poor (<4 METS),   (+) hypertension:,     (-) past MI                Neuro/Psych:   (+) neuromuscular disease:,    (-) seizures            ROS comment: neuropathy GI/Hepatic/Renal:   (+) GERD:,           Endo/Other:        (-) diabetes mellitus               Abdominal:             Vascular:   + PVD, aortic or cerebral, . Other Findings:           Anesthesia Plan      general     ASA 3 - emergent       Induction: intravenous.                         Robyn Harris MD   10/23/2021

## 2021-10-23 NOTE — FLOWSHEET NOTE
SPIRITUAL CARE PROGRESS NOTE    Spiritual Assessment:  encountered patient as part of spiritual care rounds. Patient was approachable, receptive of 's presence. Patient shared with  brief narrative behind hospitalization. Patient shared he was coping with his health/issues/hospitalization. Patient shared losing his wife to cancer and he has three children who live out of town in different states. Patient shared he is [employer] and his Orthodox is aware of his hospitalization. Intervention:  engaged the patient through active listening about his feelings.  was a spiritual presence to the patient and nurtured hope. Outcome: Chaplains are available for specific request visits at any time and may be paged via [address] [place] [address]. 10/23/21 1955   Encounter Summary   Services provided to: Patient   Referral/Consult From: 2025 Charlie Muñoz   (10/23/2021)   Complexity of Encounter Low   Length of Encounter 30 minutes   Spiritual Assessment Completed Yes   Routine   Type Initial   Assessment Calm; Approachable;Coping; Sad   Intervention Active listening;Explored feelings, thoughts, concerns;Nurtured hope;Explored coping resources;Sustaining presence/ Ministry of presence   Outcome Expressed gratitude   Spiritual/Moravian   Type Spiritual support     Electronically signed by Coretta Jones on 10/23/2021 at 8:00 PM.  [facility]  [phone]

## 2021-10-23 NOTE — PROGRESS NOTES
Pharmacy Vancomycin Consult     Vancomycin Day: 3  Current Dosinmg IV Q24H  Current indication: Skin and soft tissue infection    Temp max:  afebrile    Recent Labs     10/21/21  1930 10/22/21  0540   BUN 20 16   CREATININE 0.97 0.96   WBC 15.5* 12.6*       Intake/Output Summary (Last 24 hours) at 10/23/2021 0944  Last data filed at 10/23/2021 2717  Gross per 24 hour   Intake 577.27 ml   Output 600 ml   Net -22.73 ml     Culture Date      Source                       Results  10.21                  Wound                        Viridans Strep  10.21                  Blood x2                     Pending  10.22                  Wound                        Pending    Ht Readings from Last 1 Encounters:   10/22/21 6' 1\" (1.854 m)        Wt Readings from Last 1 Encounters:   10/23/21 215 lb 6.2 oz (97.7 kg)       Body mass index is 28.42 kg/m². Estimated Creatinine Clearance: 72 mL/min (based on SCr of 0.96 mg/dL). Random: 14.1 mcg/mL    Assessment/Plan:  Based on random level this morning, will increase dosing to 1500mg IV Q18H and continue to follow.     Jerrald Cabot, PharmD BCPS [facility]  10/23/2021 9:44 AM

## 2021-10-23 NOTE — PROGRESS NOTES
wounds with history of osteomyelitis and subsequent amputations who presents to the ER with a plantar aspect right foot wound that has been ongoing over the last 3 days. Patient had initial visit today at [facility] [address] [place] [address] who recommended admission for MRI and antibiotics. He denies any CP, SOB, ABD pain, n/v/d, HA/dizziness, cough or fevers. Imaging demonstrates cellulitis with acute osteomyelitis of the 4th metatarsophalangeal joint and adjacent bony structures and acute/chronic osteomyelitis of the distal 2nd phalanx. Patient was started on broad spectrum IV antibiotics and Podiatry was was consulted from the ER. Patient is admitted to [facility] for continued management. Review of Systems:     Constitutional:  negative for chills, fevers, sweats  Respiratory:  negative for cough, dyspnea on exertion, shortness of breath, wheezing  Cardiovascular:  negative for chest pain, chest pressure/discomfort, lower extremity edema, palpitations  Gastrointestinal:  negative for abdominal pain, constipation, diarrhea, nausea, vomiting  Neurological:  negative for dizziness, headache    Medications: Allergies:     Allergies   Allergen Reactions    Sulfa Antibiotics        Current Meds:   Scheduled Meds:    vancomycin  1,500 mg IntraVENous Q18H    valsartan  80 mg Oral Daily    carvedilol  6.25 mg Oral BID WC    sodium chloride flush  5-40 mL IntraVENous 2 times per day    enoxaparin  40 mg SubCUTAneous Daily    piperacillin-tazobactam  3,375 mg IntraVENous Q8H    vancomycin (VANCOCIN) intermittent dosing (placeholder)   Other RX Placeholder     Continuous Infusions:    sodium chloride      dextrose       PRN Meds: traZODone, sodium chloride flush, sodium chloride flush, sodium chloride, potassium chloride **OR** potassium alternative oral replacement **OR** potassium chloride, magnesium sulfate, ondansetron **OR** ondansetron, polyethylene glycol, acetaminophen **OR** acetaminophen, glucose, dextrose, glucagon (rDNA), dextrose    Data:     Past Medical History:   has a past medical history of Arm fracture, Back injury, Eczema, Femur fracture (HCC), GERD (gastroesophageal reflux disease), Glaucoma, Hypertension, Idiopathic peripheral neuropathy, MRSA (methicillin resistant staph aureus) culture positive, MSSA (methicillin susceptible Staphylococcus aureus), Osteomyelitis ([facility] [address] Shoulder fracture, and Ulcerated, foot ([facility] [address] Social History:   reports that he has never smoked. He has never used smokeless tobacco. He reports that he does not drink alcohol and does not use drugs. Family History: No family history on file. Vitals:  BP (!) 162/80   Pulse 60   Temp 97.6 °F (36.4 °C) (Oral)   Resp 18   Ht 6' 1\" (1.854 m)   Wt 215 lb 6.2 oz (97.7 kg)   SpO2 98%   BMI 28.42 kg/m²   Temp (24hrs), Av.8 °F (36.6 °C), Min:97.6 °F (36.4 °C), Max:98 °F (36.7 °C)    Recent Labs     10/22/21  0755 10/22/21  1156 10/22/21  2056 10/23/21  0811   POCGLU 111* 139* 143* 112*       I/O (24Hr):     Intake/Output Summary (Last 24 hours) at 10/23/2021 1205  Last data filed at 10/23/2021 1018  Gross per 24 hour   Intake 577.27 ml   Output 700 ml   Net -122.73 ml       Labs:  Hematology:  Recent Labs     10/21/21  1930 10/22/21  0540   WBC 15.5* 12.6*   RBC 4.66 4.55   HGB 13.4 13.1   HCT 41.3 41.7   MCV 88.6 91.6   MCH 28.8 28.8   MCHC 32.4 31.4   RDW 13.9 13.8   * 426   MPV 9.2 9.4     Chemistry:  Recent Labs     10/21/21  1930 10/22/21  0540    138   K 4.2 3.9    103   CO2 22 20   GLUCOSE 97 97   BUN 20 16   CREATININE 0.97 0.96   ANIONGAP 11 15   LABGLOM >60 >60   GFRAA >60 >60   CALCIUM 8.9 9.0     Recent Labs     10/21/21  2337 10/22/21  0540 10/22/21  0755 10/22/21  1156 10/22/21  2056 10/23/21  0811   LABA1C  --  6.1*  --   --   --   --    POCGLU 127*  --  111* 139* 143* 112*     ABG:No results found for: POCPH, PHART, PH, POCPCO2, IMK6DMV, PCO2, POCPO2, PO2ART, PO2, POCHCO3, WUX4JNJ, HCO3, NBEA, PBEA, BEART, BE, THGBART, THB, OCZ1JYZ, OPAS6UAT, V9BDVQBJ, O2SAT, FIO2  Lab Results   Component Value Date/Time    SPECIAL NOT REPORTED 10/22/2021 12:25 AM     Lab Results   Component Value Date/Time    CULTURE PENDING 10/22/2021 12:25 AM       Radiology:  XR FOOT RIGHT (MIN 3 VIEWS)    Result Date: 10/21/2021  Cellulitis with associated acute osteomyelitis involving the 4th metatarsophalangeal joint/adjacent bony structures. Acute or chronic osteomyelitis of the distal 2nd phalanx. Physical Examination:        General appearance:  alert, cooperative and no distress  Mental Status:  oriented to person, place and time and normal affect  Lungs:  clear to auscultation bilaterally, normal effort  Heart:  regular rate and rhythm, no murmur  Abdomen:  soft, nontender, nondistended, normal bowel sounds, no masses, hepatomegaly, splenomegaly  Extremities:  no edema, redness, tenderness in the calves  Skin:  no gross lesions, rashes, induration    Assessment:        Hospital Problems         Last Modified POA    * (Principal) Osteomyelitis of right foot ([address] 10/22/2021 Yes    Benign essential HTN 10/22/2021 Yes          Plan:        1. Osteomyelitis of the right footconsult infectious disease and podiatry. Continue vancomycin and Zosyn. Likely will need long-term antibiotics. Podiatry for care of osteomyelitis, MRI confirms osteomyelitis. Infectious disease for recommendations on long-term antibiotics  2.  Essential hypertensioncontinue home medications    Gregoria Moffett DO  10/23/2021  12:05 PM

## 2021-10-23 NOTE — BRIEF OP NOTE
PODIATRY BRIEF OP NOTE    PATIENT NAME: David Reid  YOB: 1940  -  78 y.o. male  MRN: [id number]  DATE: 10/23/2021  BILLING #: [id number]    Surgeon(s):  Leda Marie DPM     ASSISTANTS: Ellis Smith DPM PGY-1    PRE-OP DIAGNOSIS:   1. Osteomyelitis, right foot  2. [place], right foot    POST-OP DIAGNOSIS: Same as above. PROCEDURE:   1. Transmetatarsal amputation, right foot  2. Achilles tendon lengthening, right lower extremity  3. Debridement of non-viable tissue and bone , right foot    ANESTHESIA: MAC with local    HEMOSTASIS: Pneumatic ankle tourniquet @ 250 mmHg for 46 minutes. ESTIMATED BLOOD LOSS: Less than 10cc. MATERIALS:   * No implants in log *    INJECTABLES: 20cc 1:1 mix of 0.5% marcaine plain and 1% lidocaine plain      SPECIMEN:   ID Type Source Tests Collected by Time Destination   1 : RIGHT 4 TH METATARSAL FOOT  Tissue Foot CULTURE, FUNGUS, CULTURE, ANAEROBIC AND AEROBIC Leda Marie DPM 10/23/2021 1514    A : DISTAL RIGHT FOOT  Tissue Foot SURGICAL PATHOLOGY Leda Marie DPM 10/23/2021 1509    B : RIGHT 4 TH METATARSAL RIGHT FOOT  Tissue Foot SURGICAL PATHOLOGY Leda Marie DPM 10/23/2021 1484        COMPLICATIONS: none    FINDINGS: Right 4th ray was found to be necrotic and soft. Remnant metatarsal bones were found to be hard with healthy bone marrow. The patient was counseled at length about the risks of greg Covid-19 during their perioperative period and any recovery window from their procedure. The patient was made aware that greg Covid-19  may worsen their prognosis for recovering from their procedure  and lend to a higher morbidity and/or mortality risk. All material risks, benefits, and reasonable alternatives including postponing the procedure were discussed. The patient does wish to proceed with the procedure at this time.     Ellis Smith DPM   Podiatric Medicine & Surgery   10/23/2021 at 3:58 PM

## 2021-10-23 NOTE — ANESTHESIA POSTPROCEDURE EVALUATION
Department of Anesthesiology  Postprocedure Note    Patient: Royce Jon  MRN: [id number]  [place]: 1940  Date of evaluation: 10/23/2021  Time:  5:03 PM     Procedure Summary     Date: 10/23/21 Room / Location: [address]    Anesthesia Start: 2631 Anesthesia Stop: 1762    Procedures:       TRANSMETATARSAL AMPUTATION RIGHT FOOT (Right )      ACHILLES TENDON LENGTHENING REPAIR (Right ) Diagnosis: (OSTEOMYELITIS RIGHT FOOT)    Surgeons: Sandy Jeff DPM Responsible Provider: Tigre Rust MD    Anesthesia Type: general ASA Status: 3 - Emergent          Anesthesia Type: general    Justin Phase I: Justin Score: 8    Justin Phase II:      Last vitals: Reviewed and per EMR flowsheets.        Anesthesia Post Evaluation    Patient location during evaluation: PACU  Patient participation: complete - patient participated  Level of consciousness: awake and alert  Pain score: 0  Nausea & Vomiting: no nausea  Cardiovascular status: hemodynamically stable  Respiratory status: nasal cannula

## 2021-10-23 NOTE — PLAN OF CARE
Problem: Falls - Risk of:  Goal: Will remain free from falls  Description: Will remain free from falls  10/22/2021 2015 by Ovidio Meade RN  Outcome: Ongoing  10/22/2021 1606 by Memo Rome RN  Outcome: Ongoing  Goal: Absence of physical injury  Description: Absence of physical injury  10/22/2021 2015 by Ovidio Meade RN  Outcome: Ongoing  10/22/2021 1606 by Memo Rome RN  Outcome: Ongoing     Problem: SAFETY  Goal: Free from accidental physical injury  10/22/2021 2015 by Ovidio Meade RN  Outcome: Ongoing  10/22/2021 1606 by Memo Rome RN  Outcome: Ongoing  Goal: Free from intentional harm  10/22/2021 2015 by Ovidio Meade RN  Outcome: Ongoing  10/22/2021 1606 by Memo Rome RN  Outcome: Ongoing     Problem: DAILY CARE  Goal: Daily care needs are met  10/22/2021 2015 by Ovidio Meade RN  Outcome: Ongoing  10/22/2021 1606 by Memo Rome RN  Outcome: Ongoing     Problem: PAIN  Goal: Patient's pain/discomfort is manageable  10/22/2021 2015 by Ovidio Meade RN  Outcome: Ongoing  10/22/2021 1606 by Memo Rome RN  Outcome: Ongoing     Problem: SKIN INTEGRITY  Goal: Skin integrity is maintained or improved  10/22/2021 2015 by Ovidio Meade RN  Outcome: Ongoing  10/22/2021 1606 by Memo Rome RN  Outcome: Ongoing     Problem: KNOWLEDGE DEFICIT  Goal: Patient/S.O. demonstrates understanding of disease process, treatment plan, medications, and discharge instructions.   10/22/2021 2015 by Ovidio Meade RN  Outcome: Ongoing  10/22/2021 1606 by Memo Rome RN  Outcome: Ongoing     Problem: DISCHARGE BARRIERS  Goal: Patient's continuum of care needs are met  10/22/2021 2015 by Ovidio Meade RN  Outcome: Ongoing  10/22/2021 1606 by Memo Rome RN  Outcome: Ongoing

## 2021-10-23 NOTE — PROGRESS NOTES
Progress Note  Podiatric Medicine and Surgery     Subjective     CC: right foot wound    Patient seen and examined at bedside. No acute events overnight. Afebrile, vital signs stable  Patient is scheduled to go to surgery today in the PM as soon as OR is available. HPI :  Dorina Arreguin is a 78 y.o. male with chronic right foot ulceration.  Patient states he has a history of limb length discrepancy, R>L.  He does see a podiatrist and has orthotics but does not wear them to help offload and prevent ulcereations. Patient reports has had multiple toe amputations for this issue and osteomyelitis requiring IV antibiotics.  Patient reports that the foot is sore around the wound that he has had for the last 3 days and that he also has peripheral neuropathy.  He denies seeing a vascular surgeon or having any vascular work-up in the past. Per chart review, was seen at a wound care clinic today at [facility] and his foot was debrided and dressed. Per ED note, the physician there recommended he be admitted for an MRI of his foot and IV antibiotics. Patient has PMH of type 2 diabetes mellitus with neuropathy. PCP is Dami Angelo MD    ROS: Denies N/V/F/C/SOB/CP. Otherwise negative except at stated in the HPI.      Medications:  Scheduled Meds:   valsartan  80 mg Oral Daily    carvedilol  6.25 mg Oral BID WC    sodium chloride flush  5-40 mL IntraVENous 2 times per day    enoxaparin  40 mg SubCUTAneous Daily    piperacillin-tazobactam  3,375 mg IntraVENous Q8H    vancomycin (VANCOCIN) intermittent dosing (placeholder)   Other RX Placeholder    vancomycin  1,500 mg IntraVENous Q24H       Continuous Infusions:   sodium chloride      dextrose         PRN Meds:sodium chloride flush, sodium chloride flush, sodium chloride, potassium chloride **OR** potassium alternative oral replacement **OR** potassium chloride, magnesium sulfate, ondansetron **OR** ondansetron, polyethylene glycol, acetaminophen **OR** acetaminophen, glucose, dextrose, glucagon (rDNA), dextrose    Objective     Vitals:  Patient Vitals for the past 8 hrs:   BP Temp Temp src Pulse Resp Weight   10/23/21 0512      215 lb 6.2 oz (97.7 kg)   10/23/21 0400 (!) 141/69 97.8 °F (36.6 °C) Oral 75 18      Average, Min, and Max for last 24 hours Vitals:  TEMPERATURE:  Temp  Av.9 °F (36.6 °C)  Min: 97.8 °F (36.6 °C)  Max: 98 °F (36.7 °C)    RESPIRATIONS RANGE: Resp  Av  Min: 18  Max: 18    PULSE RANGE: Pulse  Av  Min: 72  Max: 75    BLOOD PRESSURE RANGE:  Systolic (27JKW), NOP:432 , Min:141 , JGI:715   ; Diastolic (44YTM), MJZ:64, Min:68, Max:71      PULSE OXIMETRY RANGE: SpO2  Av %  Min: 99 %  Max: 99 %    I/O last 3 completed shifts: In: 577.3 [P.O.:180; IV Piggyback:397.3]  Out: 400 [Urine:400]    CBC:  Recent Labs     10/21/21  1930 10/22/21  0540   WBC 15.5* 12.6*   HGB 13.4 13.1   HCT 41.3 41.7   * 426        BMP:  Recent Labs     10/21/21  1930 10/22/21  0540    138   K 4.2 3.9    103   CO2 22 20   BUN 20 16   CREATININE 0.97 0.96   GLUCOSE 97 97   CALCIUM 8.9 9.0        Coags:  No results for input(s): APTT, PROT, INR in the last 72 hours. No results found for: SEDRATE  No results for input(s): CRP in the last 72 hours. Lower Extremity Physical Exam: Dressing is intact due to surgery. Physical exam is from 10/22/21  Vascular: DP and PT pulses are non-palpable, bilaterally. CFT <4 seconds to all digits, bilaterally. Hair growth is absent to the level of the digits, bilaterally. Mild, nonpitting edema noted to right foot with increase in associated warmth       Neuro: Saph/sural/SP/DP/plantar sensation absent to light touch.     Musculoskeletal: Muscle strength is 4/5 to all lower extremity muscle groups. Gross deformity is present to right foot with amputations of 1st, 3rd digits.  No tenderness on palpation of right foot     Dermatologic: Full thickness ulceration down to level of bone located metatarsal consistent with osteomyelitic changes  · MRI: Septic 4th MPJ and osteomyelitis of entire 4th metatarsal and proximal phalanx of 4th toe. Cellulitis noted, but no collection of fluid. Residal 3rd metatarsal with patchy edema highly suggest early osteomyelitis. · Culture: Few GNR and few Gram positive coccobacilli  · IV abx: Vanc/Zosyn  · ID consulted. Appreciate recommendation.   · Medical management per internal medicine  · Dressing remains intact to right foot: 1/4 inch packing, betadine around border, DSD, ACE bandage  · Patient is going for right TMA surgery in the afternoon around 1 PM  · Patient is npo  · Consent signed and placed in patient's chart  · Patient is covid vaccinated   · Morning AC held  · NWB to right lower extremity in surgical shoe  · Will discuss with Dr. Alexi Haro, [place]   Podiatric Medicine & Surgery   10/23/2021 at 7:58 AM

## 2021-10-24 LAB
ABSOLUTE EOS #: <0.03 K/UL (ref 0–0.44)
ABSOLUTE IMMATURE GRANULOCYTE: 0.19 K/UL (ref 0–0.3)
ABSOLUTE LYMPH #: 1.36 K/UL (ref 1.1–3.7)
ABSOLUTE MONO #: 1.1 K/UL (ref 0.1–1.2)
ALBUMIN SERPL-MCNC: 3 G/DL (ref 3.5–5.2)
ANION GAP SERPL CALCULATED.3IONS-SCNC: 14 MMOL/L (ref 9–17)
BASOPHILS # BLD: 0 % (ref 0–2)
BASOPHILS ABSOLUTE: 0.03 K/UL (ref 0–0.2)
BUN BLDV-MCNC: 14 MG/DL (ref 8–23)
BUN/CREAT BLD: ABNORMAL (ref 9–20)
CALCIUM SERPL-MCNC: 8.7 MG/DL (ref 8.6–10.4)
CHLORIDE BLD-SCNC: 103 MMOL/L (ref 98–107)
CO2: 16 MMOL/L (ref 20–31)
CREAT SERPL-MCNC: 0.88 MG/DL (ref 0.7–1.2)
DIFFERENTIAL TYPE: ABNORMAL
EOSINOPHILS RELATIVE PERCENT: 0 % (ref 1–4)
GFR AFRICAN AMERICAN: >60 ML/MIN
GFR NON-AFRICAN AMERICAN: >60 ML/MIN
GFR SERPL CREATININE-BSD FRML MDRD: ABNORMAL ML/MIN/{1.73_M2}
GFR SERPL CREATININE-BSD FRML MDRD: ABNORMAL ML/MIN/{1.73_M2}
GLUCOSE BLD-MCNC: 147 MG/DL (ref 75–110)
GLUCOSE BLD-MCNC: 152 MG/DL (ref 75–110)
GLUCOSE BLD-MCNC: 153 MG/DL (ref 70–99)
HCT VFR BLD CALC: 41.5 % (ref 40.7–50.3)
HEMOGLOBIN: 13 G/DL (ref 13–17)
IMMATURE GRANULOCYTES: 1 %
LYMPHOCYTES # BLD: 7 % (ref 24–43)
MAGNESIUM: 2.4 MG/DL (ref 1.6–2.6)
MCH RBC QN AUTO: 29.5 PG (ref 25.2–33.5)
MCHC RBC AUTO-ENTMCNC: 31.3 G/DL (ref 28.4–34.8)
MCV RBC AUTO: 94.3 FL (ref 82.6–102.9)
MONOCYTES # BLD: 6 % (ref 3–12)
NRBC AUTOMATED: 0 PER 100 WBC
PDW BLD-RTO: 13.6 % (ref 11.8–14.4)
PHOSPHORUS: 2.7 MG/DL (ref 2.5–4.5)
PLATELET # BLD: 435 K/UL (ref 138–453)
PLATELET ESTIMATE: ABNORMAL
PMV BLD AUTO: 9.3 FL (ref 8.1–13.5)
POTASSIUM SERPL-SCNC: 4.5 MMOL/L (ref 3.7–5.3)
RBC # BLD: 4.4 M/UL (ref 4.21–5.77)
RBC # BLD: ABNORMAL 10*6/UL
SEG NEUTROPHILS: 86 % (ref 36–65)
SEGMENTED NEUTROPHILS ABSOLUTE COUNT: 16.13 K/UL (ref 1.5–8.1)
SODIUM BLD-SCNC: 133 MMOL/L (ref 135–144)
WBC # BLD: 18.8 K/UL (ref 3.5–11.3)
WBC # BLD: ABNORMAL 10*3/UL

## 2021-10-24 PROCEDURE — 2580000003 HC RX 258: Performed by: PODIATRIST

## 2021-10-24 PROCEDURE — 6360000002 HC RX W HCPCS: Performed by: PODIATRIST

## 2021-10-24 PROCEDURE — 85025 COMPLETE CBC W/AUTO DIFF WBC: CPT

## 2021-10-24 PROCEDURE — 6370000000 HC RX 637 (ALT 250 FOR IP): Performed by: INTERNAL MEDICINE

## 2021-10-24 PROCEDURE — 99232 SBSQ HOSP IP/OBS MODERATE 35: CPT | Performed by: INTERNAL MEDICINE

## 2021-10-24 PROCEDURE — 80069 RENAL FUNCTION PANEL: CPT

## 2021-10-24 PROCEDURE — 36415 COLL VENOUS BLD VENIPUNCTURE: CPT

## 2021-10-24 PROCEDURE — 6370000000 HC RX 637 (ALT 250 FOR IP): Performed by: PODIATRIST

## 2021-10-24 PROCEDURE — 97530 THERAPEUTIC ACTIVITIES: CPT

## 2021-10-24 PROCEDURE — 1200000000 HC SEMI PRIVATE

## 2021-10-24 PROCEDURE — 82947 ASSAY GLUCOSE BLOOD QUANT: CPT

## 2021-10-24 PROCEDURE — 97162 PT EVAL MOD COMPLEX 30 MIN: CPT

## 2021-10-24 PROCEDURE — 6370000000 HC RX 637 (ALT 250 FOR IP): Performed by: NURSE PRACTITIONER

## 2021-10-24 PROCEDURE — 83735 ASSAY OF MAGNESIUM: CPT

## 2021-10-24 RX ORDER — HYDROCODONE BITARTRATE AND ACETAMINOPHEN 5; 325 MG/1; MG/1
1 TABLET ORAL EVERY 6 HOURS PRN
Qty: 28 TABLET | Refills: 0 | Status: SHIPPED | OUTPATIENT
Start: 2021-10-24 | End: 2021-10-26 | Stop reason: HOSPADM

## 2021-10-24 RX ORDER — CALCIUM CARBONATE 200(500)MG
500 TABLET,CHEWABLE ORAL 3 TIMES DAILY PRN
Status: DISCONTINUED | OUTPATIENT
Start: 2021-10-24 | End: 2021-10-26 | Stop reason: HOSPADM

## 2021-10-24 RX ORDER — POLYETHYLENE GLYCOL 3350 17 G/17G
17 POWDER, FOR SOLUTION ORAL DAILY
Status: DISCONTINUED | OUTPATIENT
Start: 2021-10-24 | End: 2021-10-26 | Stop reason: HOSPADM

## 2021-10-24 RX ADMIN — TRAZODONE HYDROCHLORIDE 50 MG: 50 TABLET ORAL at 02:24

## 2021-10-24 RX ADMIN — PIPERACILLIN AND TAZOBACTAM 3375 MG: 3; .375 INJECTION, POWDER, FOR SOLUTION INTRAVENOUS at 04:42

## 2021-10-24 RX ADMIN — SODIUM CHLORIDE, PRESERVATIVE FREE 10 ML: 5 INJECTION INTRAVENOUS at 20:41

## 2021-10-24 RX ADMIN — VANCOMYCIN HYDROCHLORIDE 1500 MG: 10 INJECTION, POWDER, LYOPHILIZED, FOR SOLUTION INTRAVENOUS at 09:08

## 2021-10-24 RX ADMIN — PIPERACILLIN AND TAZOBACTAM 3375 MG: 3; .375 INJECTION, POWDER, FOR SOLUTION INTRAVENOUS at 20:41

## 2021-10-24 RX ADMIN — CARVEDILOL 12.5 MG: 12.5 TABLET, FILM COATED ORAL at 09:08

## 2021-10-24 RX ADMIN — OXYCODONE HYDROCHLORIDE 5 MG: 5 TABLET ORAL at 01:38

## 2021-10-24 RX ADMIN — POLYETHYLENE GLYCOL 3350 17 G: 17 POWDER, FOR SOLUTION ORAL at 09:09

## 2021-10-24 RX ADMIN — CARVEDILOL 12.5 MG: 12.5 TABLET, FILM COATED ORAL at 17:43

## 2021-10-24 RX ADMIN — ANTACID TABLETS 500 MG: 500 TABLET, CHEWABLE ORAL at 06:35

## 2021-10-24 RX ADMIN — ENOXAPARIN SODIUM 40 MG: 40 INJECTION SUBCUTANEOUS at 09:09

## 2021-10-24 RX ADMIN — ONDANSETRON 4 MG: 2 INJECTION INTRAMUSCULAR; INTRAVENOUS at 02:03

## 2021-10-24 RX ADMIN — ANTACID TABLETS 500 MG: 500 TABLET, CHEWABLE ORAL at 17:43

## 2021-10-24 RX ADMIN — SENNOSIDES 17.2 MG: 8.6 TABLET, FILM COATED ORAL at 20:41

## 2021-10-24 RX ADMIN — PIPERACILLIN AND TAZOBACTAM 3375 MG: 3; .375 INJECTION, POWDER, FOR SOLUTION INTRAVENOUS at 11:59

## 2021-10-24 ASSESSMENT — ENCOUNTER SYMPTOMS
RESPIRATORY NEGATIVE: 1
ALLERGIC/IMMUNOLOGIC NEGATIVE: 1
GASTROINTESTINAL NEGATIVE: 1

## 2021-10-24 ASSESSMENT — PAIN SCALES - GENERAL
PAINLEVEL_OUTOF10: 6
PAINLEVEL_OUTOF10: 0

## 2021-10-24 NOTE — PLAN OF CARE
A knee scooter was ordered and placed on the patient for pain control and stabilization due to right foot surgery. Patient is ambulatory with weakness and instability therefore a knee scooter will help with the weakness, stabilization and pain control.

## 2021-10-24 NOTE — PROGRESS NOTES
Progress Note  Podiatric Medicine and Surgery     Subjective     CC: right foot wound    Patient seen and examined at bedside. No acute events overnight. Afebrile, vital signs stable  Patient is status post right TMA with CHRYSTAL (DOS:10/24/21)  Patient reports having heart burn last night but it has resolved. Patient complains lack of bowel movement. HPI :  Cesario Rivas is a 78 y.o. male with chronic right foot ulceration.  Patient states he has a history of limb length discrepancy, R>L.  He does see a podiatrist and has orthotics but does not wear them to help offload and prevent ulcereations. Patient reports has had multiple toe amputations for this issue and osteomyelitis requiring IV antibiotics.  Patient reports that the foot is sore around the wound that he has had for the last 3 days and that he also has peripheral neuropathy.  He denies seeing a vascular surgeon or having any vascular work-up in the past. Per chart review, was seen at a wound care clinic today at [facility] and his foot was debrided and dressed. Per ED note, the physician there recommended he be admitted for an MRI of his foot and IV antibiotics. Patient has PMH of type 2 diabetes mellitus with neuropathy. PCP is Roro Londono MD    ROS: Denies N/V/F/C/SOB/CP. Otherwise negative except at stated in the HPI.      Medications:  Scheduled Meds:   polyethylene glycol  17 g Oral Daily    vancomycin  1,500 mg IntraVENous Q18H    carvedilol  12.5 mg Oral BID WC    senna  2 tablet Oral Nightly    valsartan  80 mg Oral Daily    sodium chloride flush  5-40 mL IntraVENous 2 times per day    enoxaparin  40 mg SubCUTAneous Daily    piperacillin-tazobactam  3,375 mg IntraVENous Q8H    vancomycin (VANCOCIN) intermittent dosing (placeholder)   Other RX Placeholder       Continuous Infusions:   sodium chloride Stopped (10/23/21 1527)    dextrose         PRN Meds:calcium carbonate, traZODone, oxyCODONE **OR** oxyCODONE, sodium chloride flush, sodium chloride flush, sodium chloride, potassium chloride **OR** potassium alternative oral replacement **OR** potassium chloride, magnesium sulfate, ondansetron **OR** ondansetron, acetaminophen **OR** acetaminophen, glucose, dextrose, glucagon (rDNA), dextrose    Objective     Vitals:  Patient Vitals for the past 8 hrs:   BP Temp Temp src Pulse Resp SpO2   10/24/21 0730 (!) 108/52 97.5 °F (36.4 °C) Oral 64 17 97 %     Average, Min, and Max for last 24 hours Vitals:  TEMPERATURE:  Temp  Av.3 °F (35.7 °C)  Min: 69.9 °F (21.1 °C)  Max: 97.8 °F (36.6 °C)    RESPIRATIONS RANGE: Resp  Av.6  Min: 0  Max: 22    PULSE RANGE: Pulse  Av.5  Min: 64  Max: 75    BLOOD PRESSURE RANGE:  Systolic (07OFC), RJM:641 , Min:72 , GEB:316   ; Diastolic (87UOF), UMD:21, Min:47, Max:90      PULSE OXIMETRY RANGE: SpO2  Av.6 %  Min: 92 %  Max: 100 %    I/O last 3 completed shifts: In: 650 [I.V.:650]  Out: 385 [Urine:375; Blood:10]    CBC:  Recent Labs     10/21/21  1930 10/22/21  0540 10/24/21  0825   WBC 15.5* 12.6* 18.8*   HGB 13.4 13.1 13.0   HCT 41.3 41.7 41.5   * 426 435        BMP:  Recent Labs     10/21/21  1930 10/22/21  0540    138   K 4.2 3.9    103   CO2 22 20   BUN 20 16   CREATININE 0.97 0.96   GLUCOSE 97 97   CALCIUM 8.9 9.0        Coags:  No results for input(s): APTT, PROT, INR in the last 72 hours. No results found for: SEDRATE  No results for input(s): CRP in the last 72 hours. Lower Extremity Physical Exam: Dressing remains intact with posterior splint due to surgery. CFT is less than 3 seconds. Passive motor function intact. Sensation intact as well. Imaging:   MRI FOOT RIGHT W WO CONTRAST   Final Result   1. Deep ulceration extending to the 4th MTP joint with septic 4th MTP joint   and osteomyelitis of the entirety of the 4th metatarsal and proximal phalanx   of the 4th toe. Associated destructive changes of the 4th metatarsal head   and neck. 2. Soft tissue edema and enhancement consistent with cellulitis. No   well-defined drainable collection identified. 3. Patchy edema and mild enhancement at the distal amputation site of the   residual 3rd metatarsal which is highly suggestive of early changes of   osteomyelitis given adjacent soft tissue findings. VL LOWER EXTREMITY ARTERIAL SEGMENTAL PRESSURES W PPG   Final Result      XR FOOT RIGHT (MIN 3 VIEWS)   Final Result   Cellulitis with associated acute osteomyelitis involving the 4th   metatarsophalangeal joint/adjacent bony structures. Acute or chronic   osteomyelitis of the distal 2nd phalanx. Cultures: Wound 10/22/21: Few GNR and few Gram positive coccobacilli, viridans strep group moderate growth    Assessment   Dipesh Anderson is a 78 y.o. male with   1. Diabetic foot infection, right foot  2. Osteomyelitis  3. Cellulitis, right foot  4. Limb length discrepancy  5. PVD  6. Type 2 diabetes mellitus with polyneuropathy  7. status post right TMA with CHRYSTAL (DOS:10/24/21)    Principal Problem:    Osteomyelitis of right foot (HCC)  Active Problems:    Benign essential HTN  Resolved Problems:    * No resolved hospital problems. *       Plan     · Patient examined and evaluated at bedside   · Treatment options discussed in detail with the patient  · Patient is status post right TMA with CHRYSTAL (DOS:10/24/21)  · Culture: Few GNR and few Gram positive coccobacilli, virdian strep moderate growth  · IV abx: Vanc/Zosyn  · ID consulted. Appreciate recommendation. · Medical management per internal medicine  · Dressing remains intact due to surgery. Patient will need to follow up with Dr. Moreno Hallman one week after discharge. · Patient will likely go to a facility afterward per case management. · PT/OT will evaluate patient. · Podiatry will likely sign off tomorrow.    · NWB to right lower extremity in posterior splint  · Discussed with Dr. Jered Donahue, [facility]   Podiatric Medicine & Surgery   10/24/2021 at 9:08 AM

## 2021-10-24 NOTE — DISCHARGE INSTR - COC
Continuity of Care Form    Patient Name: Erwin Alberts   :  1940  MRN:  [id number]    Admit date:  10/21/2021  Discharge date:  10/26/2021    Code Status Order: Full Code   Advance Directives:      Admitting Physician:  Yoli Javed DO  PCP: Leatha Lovelace MD    Discharging Nurse: [facility]/Room#: 6407/1202-35  Discharging Unit Phone Number: [phone]    Emergency Contact:   Extended Emergency Contact Information  Primary Emergency Contact: Jairo Vazquez   [address] Phone: [phone]  Relation: Child    Past Surgical History:  Past Surgical History:   Procedure Laterality Date    BACK SURGERY      COLONOSCOPY      FOOT SURGERY Right 10/23/2021    Transmetatarsal amputation/Achilles tendon lengthening, right lower extremity/Debridement of non-viable tissue and bone , right foot    HIP SURGERY      ORIF left hip       Immunization History:   Immunization History   Administered Date(s) Administered    COVID-19, Moderna, PF, 100mcg/0.5mL 2021    COVID-19Robert, PF, 30mcg/0.3mL 2021, 2021       Active Problems:  Patient Active Problem List   Diagnosis Code    Chronic osteomyelitis of right foot with draining sinus (HCC) M86.471    Idiopathic peripheral neuropathy G60.9    Mild protein malnutrition ([address] E44.1    Foot osteomyelitis, right ([address] M86.9    Open wound of right foot S91.301A    Open wound of right foot S91.301A    Osteomyelitis of right foot ([address] M86.9    Benign essential HTN I10       Isolation/Infection:   Isolation            Contact          Unreconciled Outside Infections       Enable clinical decision support by reconciling outside information with the patient's chart. .      Infection Onset Last Indicated Last Received Source    Infections with existing documentation    MRSA 07/19/18 07/07/20 10/21/21 [address] [place], [place] . ..           Patient Infection Status       Infection Onset Added Last Indicated Last Indicated By Review Planned Expiration Resolved Resolved By    MDRO (multi-drug resistant organism)  09/27/17 09/27/17 Alka Hooks RN        E. Coli - foot 9/2017      MRSA  09/27/17 09/27/17 Alka Hooks RN        Foot - 4/2018            Nurse Assessment:  Last Vital Signs: BP (!) 108/52   Pulse 64   Temp 97.5 °F (36.4 °C) (Oral)   Resp 17   Ht 6' 1\" (1.854 m)   Wt 215 lb 2.7 oz (97.6 kg)   SpO2 97%   BMI 28.39 kg/m²     Last documented pain score (0-10 scale): Pain Level: 6  Last Weight:   Wt Readings from Last 1 Encounters:   10/24/21 215 lb 2.7 oz (97.6 kg)     Mental Status:  oriented, alert, coherent, logical, thought processes intact, and able to concentrate and follow conversation    IV Access:  - None    Nursing Mobility/ADLs:  Walking   Independent  Transfer  Independent  [facility] Delivery   none    Wound Care Documentation and Therapy:  Pressure Ulcer 09/25/17 WD#1 RIGHT PLANTAR FOOT  ETIOLOGY NEUROPATHY STARTED JANUARY 2016 (Active)   Number of days: 1490        Elimination:  Continence: Bowel: Yes  Bladder: Yes  Urinary Catheter: None   Colostomy/Ileostomy/Ileal Conduit: No       Date of Last BM: 10/25/2021      Intake/Output Summary (Last 24 hours) at 10/24/2021 1135  Last data filed at 10/24/2021 1108  Gross per 24 hour   Intake 650 ml   Output 635 ml   Net 15 ml     I/O last 3 completed shifts: In: 650 [I.V.:650]  Out: 385 [Urine:375; Blood:10]    Safety Concerns:      At Risk for Falls    Impairments/Disabilities:      Amputation - right toes    Nutrition Therapy:  Current Nutrition Therapy:   - Oral Diet:  Carb Control 5 carbs/meal (2000kcals/day)    Routes of Feeding: Oral  Liquids: No Restrictions  Daily Fluid Restriction: no  Last Modified Barium Swallow with Video (Video Swallowing Test): not done    Treatments at the Time of Hospital Discharge:   Respiratory

## 2021-10-24 NOTE — PROGRESS NOTES
[facility]  Office: [address], DO, Evette Mejia, DO, Yoon Hendricks, DO, Conrad Ramires, DO, Lakshmi Monroe MD, Hiram Butts MD, Leah Hernandez MD, Breanne Vale MD, Batsheva Reed MD, Jayda Guajardo MD, Tutu Andrade MD, Salvador Roman MD, Elisabeth Moran, DO, Sophie Farrell DO, Kwasi Yanez MD,  Loreto Mera, DO, Rip Colon MD, Rosalba Bowie MD, Silvio Meza MD, Keagan Mendez MD, Beatriz Trevino MD, Moustapha Mendez MD, Alferd Felty, CNP, [facility], CNP, Purvi Zamora, CNP, Lauren Garcia, CNS, Yong Guzman, CNP, Rosa Begum, CNP, Selene Meeks, CNP, Nataliia Kirkland, CNP, Ethyl Mercury, CNP, Danielle Nolan, CNP, Robert Johnston PA-C, Neha Lopez, [facility], Nathan Dewey, CNP, Rodney Mcmillan, CNP, Enzo Mo, CNP, Jose Carlos Ibarra, CNP, Guicho Hemphill, CNP, Sandy Cooper, [facility], Varun Fitzgeralds, [address] [place] [address]    Progress Note    10/24/2021    12:49 PM    Name:   Kelly Ro  MRN:     [id number]     Acct:      [de-identified]   Room:   [address] [place] [address] Day:  3  Admit Date:  10/21/2021  6:35 PM    PCP:   Shania Rocha MD  Code Status:  Full Code    Subjective:     C/C:   Chief Complaint   Patient presents with    Wound Check     Interval History Status: improved. Seen and examined this morning. Infectious disease consulted for antibiotic recommendations, discussed rehab needs and home care    Brief History:     From H&P  Kelly Ro is a 78 y.o. Non- / non  male who presents with Wound Check   and is admitted to the hospital for the management of Osteomyelitis of right foot ([facility]    This is a very pleasant 78 yr old male with underlying history of HTN,leg length discrepancy after a fall/femur fracture and chronic foot wounds with history of osteomyelitis and subsequent amputations who presents to the ER with a plantar aspect right foot wound that has been ongoing over the last 3 days. Patient had initial visit today at [facility] [address] [place] [address] who recommended admission for MRI and antibiotics. He denies any CP, SOB, ABD pain, n/v/d, HA/dizziness, cough or fevers. Imaging demonstrates cellulitis with acute osteomyelitis of the 4th metatarsophalangeal joint and adjacent bony structures and acute/chronic osteomyelitis of the distal 2nd phalanx. Patient was started on broad spectrum IV antibiotics and Podiatry was was consulted from the ER. Patient is admitted to [facility] for continued management. Review of Systems:     Constitutional:  negative for chills, fevers, sweats  Respiratory:  negative for cough, dyspnea on exertion, shortness of breath, wheezing  Cardiovascular:  negative for chest pain, chest pressure/discomfort, lower extremity edema, palpitations  Gastrointestinal:  negative for abdominal pain, constipation, diarrhea, nausea, vomiting  Neurological:  negative for dizziness, headache    Medications:      Allergies:    No Known Allergies    Current Meds:   Scheduled Meds:    polyethylene glycol  17 g Oral Daily    vancomycin  1,500 mg IntraVENous Q18H    carvedilol  12.5 mg Oral BID WC    senna  2 tablet Oral Nightly    valsartan  80 mg Oral Daily    sodium chloride flush  5-40 mL IntraVENous 2 times per day    enoxaparin  40 mg SubCUTAneous Daily    piperacillin-tazobactam  3,375 mg IntraVENous Q8H    vancomycin (VANCOCIN) intermittent dosing (placeholder)   Other RX Placeholder     Continuous Infusions:    sodium chloride Stopped (10/23/21 4827)    dextrose       PRN Meds: calcium carbonate, bisacodyl, traZODone, oxyCODONE **OR** oxyCODONE, sodium chloride flush, sodium chloride flush, sodium chloride, potassium chloride **OR** potassium alternative oral replacement **OR** potassium chloride, magnesium sulfate, ondansetron **OR** ondansetron, acetaminophen **OR** acetaminophen, glucose, dextrose, glucagon (rDNA), dextrose    Data:     Past Medical History:   has a past medical history of Arm fracture, Back injury, Eczema, Femur fracture ([facility]), GERD (gastroesophageal reflux disease), Glaucoma, Hypertension, Idiopathic peripheral neuropathy, MRSA (methicillin resistant staph aureus) culture positive, MSSA (methicillin susceptible Staphylococcus aureus), Osteomyelitis ([facility] Shoulder fracture, and Ulcerated, foot ([facility] Social History:   reports that he has never smoked. He has never used smokeless tobacco. He reports that he does not drink alcohol and does not use drugs. Family History: No family history on file. Vitals:  /72   Pulse 61   Temp 97.8 °F (36.6 °C) (Oral)   Resp 18   Ht 6' 1\" (1.854 m)   Wt 215 lb 2.7 oz (97.6 kg)   SpO2 96%   BMI 28.39 kg/m²   Temp (24hrs), Av.3 °F (35.7 °C), Min:69.9 °F (21.1 °C), Max:97.8 °F (36.6 °C)    Recent Labs     10/22/21  1156 10/22/21  2056 10/23/21  0811 10/24/21  0736   POCGLU 139* 143* 112* 152*       I/O (24Hr):     Intake/Output Summary (Last 24 hours) at 10/24/2021 1249  Last data filed at 10/24/2021 1108  Gross per 24 hour   Intake 650 ml   Output 635 ml   Net 15 ml       Labs:  Hematology:  Recent Labs     10/21/21  1930 10/22/21  0540 10/24/21  0825   WBC 15.5* 12.6* 18.8*   RBC 4.66 4.55 4.40   HGB 13.4 13.1 13.0   HCT 41.3 41.7 41.5   MCV 88.6 91.6 94.3   MCH 28.8 28.8 29.5   MCHC 32.4 31.4 31.3   RDW 13.9 13.8 13.6   * 426 435   MPV 9.2 9.4 9.3     Chemistry:  Recent Labs     10/21/21  1930 10/22/21  0540 10/24/21  0820    138 133*   K 4.2 3.9 4.5    103 103   CO2 22 20 16*   GLUCOSE 97 97 153*   BUN 20 16 14   CREATININE 0.97 0.96 0.88   MG  --   --  2.4   ANIONGAP 11 15 14   LABGLOM >60 >60 >60   GFRAA >60 >60 >60   CALCIUM 8.9 9.0 8.7   PHOS  --   --  2.7     Recent Labs     10/21/21  2337 10/22/21  0540 10/22/21  0755 10/22/21  1156 10/22/21  2056 10/23/21  0811 10/24/21  0736 10/24/21  0820   LABALBU  --   --   --   --   --   --   --  3.0*   LABA1C  --  6.1*  --   --   --   --   --   -- POCGLU 127*  --  111* 139* 143* 112* 152*  --      ABG:No results found for: POCPH, PHART, PH, POCPCO2, JII4DUB, PCO2, POCPO2, PO2ART, PO2, POCHCO3, EEM3ANV, HCO3, NBEA, PBEA, BEART, BE, THGBART, THB, BJH1WGK, SFFF5YWN, X3JPTGBF, O2SAT, FIO2  Lab Results   Component Value Date/Time    SPECIAL NOT REPORTED 10/23/2021 03:35 PM     Lab Results   Component Value Date/Time    CULTURE PENDING 10/23/2021 03:35 PM       Radiology:  XR FOOT RIGHT (MIN 3 VIEWS)    Result Date: 10/21/2021  Cellulitis with associated acute osteomyelitis involving the 4th metatarsophalangeal joint/adjacent bony structures. Acute or chronic osteomyelitis of the distal 2nd phalanx. Physical Examination:        General appearance:  alert, cooperative and no distress  Mental Status:  oriented to person, place and time and normal affect  Lungs:  clear to auscultation bilaterally, normal effort  Heart:  regular rate and rhythm, no murmur  Abdomen:  soft, nontender, nondistended, normal bowel sounds, no masses, hepatomegaly, splenomegaly  Extremities:  no edema, redness, tenderness in the calves  Skin:  no gross lesions, rashes, induration    Assessment:        Hospital Problems         Last Modified POA    * (Principal) Osteomyelitis of right foot ([address] 10/22/2021 Yes    Benign essential HTN 10/22/2021 Yes          Plan:        1. Osteomyelitis of the right footID and podiatry following, continue vancomycin and Zosyn. Oral antibiotics outpatient. Consult PMNR for rehab needs  2.  Essential hypertensioncontinue home medications    Travis Abreu DO  10/24/2021  12:49 PM

## 2021-10-24 NOTE — PROGRESS NOTES
proximal phalanx of the fourth toe. There were associated destructive changes of the fourth metatarsal head and neck. The patient was taken to the operating room and underwent transmetatarsal amputation of the right foot with Achilles tendon lengthening of the right lower extremity and debridement of nonviable tissue and bone in the right foot    We are asked to evaluate and help with antibiotic choice. Current evaluation:10/24/2021    BP (!) 108/52   Pulse 64   Temp 97.5 °F (36.4 °C) (Oral)   Resp 17   Ht 6' 1\" (1.854 m)   Wt 215 lb 2.7 oz (97.6 kg)   SpO2 97%   BMI 28.39 kg/m²     Temperature Range: Temp: 97.5 °F (36.4 °C) Temp  Av.3 °F (35.7 °C)  Min: 69.9 °F (21.1 °C)  Max: 97.8 °F (36.6 °C)  The patient is seen and evaluated at bedside is awake and alert in no acute distress. No subjective fevers or chills. No chest pains or palpitations. No abdominal pain nausea vomiting or diarrhea. Review of Systems   Constitutional: Negative. Respiratory: Negative. Cardiovascular: Negative. Gastrointestinal: Negative. Genitourinary: Negative. Musculoskeletal: Negative. Allergic/Immunologic: Negative. Neurological: Negative. Physical Examination :     Physical Exam  Constitutional:       Appearance: He is well-developed. HENT:      Head: Normocephalic and atraumatic. Cardiovascular:      Rate and Rhythm: Normal rate. Heart sounds: Normal heart sounds. No friction rub. No gallop. Pulmonary:      Effort: Pulmonary effort is normal.      Breath sounds: Normal breath sounds. No wheezing. Abdominal:      General: Bowel sounds are normal.      Palpations: Abdomen is soft. There is no mass. Tenderness: There is no abdominal tenderness. Musculoskeletal:      Cervical back: Normal range of motion and neck supple. Comments:  The right foot is in a dressing status post transmetatarsal amputation and is in a half cast.   Lymphadenopathy:      Cervical: No cervical adenopathy. Skin:     General: Skin is warm and dry. Neurological:      Mental Status: He is alert and oriented to person, place, and time. Laboratory data:   I have independently reviewed the followinglabs:  CBC with Differential:   Recent Labs     10/21/21  1930 10/21/21  1930 10/22/21  0540 10/24/21  0825   WBC 15.5*   < > 12.6* 18.8*   HGB 13.4   < > 13.1 13.0   HCT 41.3   < > 41.7 41.5   *   < > 426 435   LYMPHOPCT 19*  --   --  7*   MONOPCT 10*  --   --  6    < > = values in this interval not displayed. BMP:   Recent Labs     10/21/21  1930 10/22/21  0540    138   K 4.2 3.9    103   CO2 22 20   BUN 20 16   CREATININE 0.97 0.96     Hepatic Function Panel: No results for input(s): PROT, LABALBU, BILIDIR, IBILI, BILITOT, ALKPHOS, ALT, AST in the last 72 hours. No results found for: PROCAL  No results found for: CRP  No results found for: SEDRATE      No results found for: DDIMER  No results found for: FERRITIN  No results found for: LDH  No results found for: FIBRINOGEN    No results found for requested labs within last 30 days. No results found for: COVID19    No results for input(s): VANCOTROUGH in the last 72 hours. Imaging Studies:   MRI OF THE RIGHT FOOT WITHOUT AND WITH CONTRAST, 10/22/2021 5:04 pm  Impression   1. Deep ulceration extending to the 4th MTP joint with septic 4th MTP joint   and osteomyelitis of the entirety of the 4th metatarsal and proximal phalanx   of the 4th toe.  Associated destructive changes of the 4th metatarsal head   and neck. 2. Soft tissue edema and enhancement consistent with cellulitis.  No   well-defined drainable collection identified. 3. Patchy edema and mild enhancement at the distal amputation site of the   residual 3rd metatarsal which is highly suggestive of early changes of   osteomyelitis given adjacent soft tissue findings.        Cultures:     Culture, Blood 1 [[id number]] Collected: 10/21/21 2008   Order Status: Completed Specimen: Blood Updated: 10/24/21 0844    Specimen Description . BLOOD    Special Requests  R L FA 4 ML    Culture NO GROWTH 3 DAYS   Culture, Blood 1 [[id number]] Collected: 10/21/21 1929   Order Status: Completed Specimen: Blood Updated: 10/24/21 0844    Specimen Description . BLOOD    Special Requests  L FA 10 ML    Culture NO GROWTH 3 DAYS   Culture, Fungus [[id number]] Collected: 10/23/21 1535   Order Status: Sent Specimen: Tissue from Foot Updated: 10/23/21 1943   Culture, Anaerobic and Aerobic [[id number]] Collected: 10/23/21 1535   Order Status: Completed Specimen: Tissue from Foot Updated: 10/23/21 1937    Specimen Description . FOOT . TISSUE    Special Requests NOT REPORTED    Direct Exam NO NEUTROPHILS SEEN     NO BACTERIA SEEN    Culture PENDING   Culture, Anaerobic and Aerobic [[id number]] (Abnormal) Collected: 10/22/21 0025   Order Status: Completed Specimen: Wound Updated: 10/23/21 1516    Specimen Description . WOUND RIGHT    Special Requests NOT REPORTED    Direct Exam FEW NEUTROPHILSAbnormal      FEW GRAM NEGATIVE RODSAbnormal      FEW GRAM POSITIVE COCCOBACILLIAbnormal     Culture NORMAL CHERI   Culture, Anaerobic and Aerobic [[id number]] (Abnormal) Collected: 10/21/21 2034   Order Status: Completed Specimen: Wound Updated: 10/22/21 2019    Specimen Description . WOUND RIGHT    Special Requests NOT REPORTED    Direct Exam NO NEUTROPHILS SEEN     RARE GRAM NEGATIVE RODSAbnormal      FEW GRAM POSITIVE COCCI IN PAIRSAbnormal      FEW GRAM POSITIVE RODSAbnormal     Culture VIRIDANS STREPTOCOCCUS GROUP MODERATE GROWTH Susceptibilities have not been performed.  Notify the laboratory within 7 days for further workup if clinically indicated. Abnormal          Medications:      polyethylene glycol  17 g Oral Daily    vancomycin  1,500 mg IntraVENous Q18H    carvedilol  12.5 mg Oral BID WC    senna  2 tablet Oral Nightly    valsartan  80 mg Oral Daily    sodium chloride flush  5-40 mL IntraVENous 2 times per day    enoxaparin  40 mg SubCUTAneous Daily    piperacillin-tazobactam  3,375 mg IntraVENous Q8H    vancomycin (VANCOCIN) intermittent dosing (placeholder)   Other RX Placeholder           [facility]  Rachel Ruano MD  Perfect Serve messaging  OFFICE: [phone]      Electronically signed by Rachel Ruano MD on 10/24/2021 at 9:40 AM  Thank you for allowing us to participate in the care of this patient. Please call with questions. This note iscreated with the assistance of a speech recognition program.  While intending to generate a document that actually reflects the content of the visit, the document can still have some errors including those of syntax andsound a like substitutions which may escape proof reading. In such instances, actual meaning can be extrapolated by contextual diversion.

## 2021-10-24 NOTE — PROGRESS NOTES
Physical Therapy    Facility/Department: [facility]  Initial Assessment    NAME: Javan Haywood  : 1940  MRN: [id number]    Date of Service: 10/24/2021  Chief Complaint   Patient presents with    Wound Check     Discharge Recommendations:  Patient would benefit from continued therapy after discharge      Assessment   Body structures, Functions, Activity limitations: Decreased functional mobility ; Decreased strength;Decreased endurance  Assessment: The pt ambulated 20 ft with a RW x min assist with NWB R LE. He maintained NWB R LE fairly well and could benefit from a continuation of PT for gait and strengthening following his DC  Prognosis: Good  Decision Making: Medium Complexity  PT Education: Goals;PT Role;Plan of Care  REQUIRES PT FOLLOW UP: Yes  Activity Tolerance  Activity Tolerance: Patient limited by fatigue       Patient Diagnosis(es): The primary encounter diagnosis was Other acute osteomyelitis of right foot ([facility] [address] A diagnosis of Acute post-operative pain was also pertinent to this visit. has a past medical history of Arm fracture, Back injury, Eczema, Femur fracture ([facility]), GERD (gastroesophageal reflux disease), Glaucoma, Hypertension, Idiopathic peripheral neuropathy, MRSA (methicillin resistant staph aureus) culture positive, MSSA (methicillin susceptible Staphylococcus aureus), Osteomyelitis ([address] Shoulder fracture, and Ulcerated, foot ([address] has a past surgical history that includes back surgery; hip surgery; Colonoscopy; and Foot surgery (Right, 10/23/2021).     Restrictions  Restrictions/Precautions  Restrictions/Precautions: Weight Bearing  Lower Extremity Weight Bearing Restrictions  Right Lower Extremity Weight Bearing: Non Weight Bearing  Vision/Hearing  Vision: Impaired  Vision Exceptions: Wears glasses at all times  Hearing: Within functional limits     Subjective  General  Patient assessed for rehabilitation services?: Yes  Response To Previous Treatment: Not applicable  Family / Caregiver Present: No  Follows Commands: Within Functional Limits  Subjective  Subjective: RN and pt agreeable to PT eval  Pain Screening  Patient Currently in Pain: No  Pain Assessment  Pain Assessment: 0-10  Pain Level: 0  Vital Signs  Patient Currently in Pain: No       Orientation  Orientation  Overall Orientation Status: Within Normal Limits  Social/Functional History  Social/Functional History  Lives With: Alone  Type of Home: House  Home Layout: Two level  Home Access: Stairs to enter without rails  Entrance Stairs - Number of Steps: 2  Bathroom Shower/Tub: Tub/Shower unit, Walk-in shower  Bathroom Toilet: Standard  Bathroom Equipment: Grab bars in shower  ADL Assistance: [address]: Independent  Homemaking Responsibilities: Yes  Ambulation Assistance: Independent  Transfer Assistance: Independent  Active : Yes  Occupation: Retired  Type of occupation: [employer]  Leisure & Hobbies: Travel  Cognition      Objective     AROM RLE (degrees)  RLE AROM: WFL  AROM LLE (degrees)  LLE AROM : WNL  AROM RUE (degrees)  RUE AROM : WNL  AROM LUE (degrees)  LUE AROM : WNL  Strength RLE  Strength RLE: WFL  Strength LLE  Strength LLE: WNL  Strength RUE  Strength RUE: WNL  Strength LUE  Strength LUE: WNL     Sensation  Overall Sensation Status: WFL (Denies numbness/tingling)  Bed mobility  Supine to Sit: Contact guard assistance  Sit to Supine: Contact guard assistance  Scooting: Contact guard assistance  Transfers  Sit to Stand: Contact guard assistance  Stand to sit: Contact guard assistance  Ambulation  Ambulation?: Yes  Ambulation 1  Surface: level tile  Device: Rolling Walker  Assistance: Minimal assistance  Distance: amb 20 ft with a RW x min assit with NWB R LE     Balance  Posture: Good  Sitting - Static: Good  Sitting - Dynamic: Good  Standing - Static: Fair  Standing - Dynamic: [facility]  Times per week: 5-6x wk  Current Treatment Recommendations: Strengthening, Functional Mobility Training, Gait Training, Safety Education & Training, Endurance Training, Stair training  Safety Devices  Type of devices: Nurse notified, Left in bed, Call light within reach    G-Code     OutComes Score     AM-PAC Score  AM-PAC Inpatient Mobility Raw Score : 19 (10/24/21 1628)  AM-PAC Inpatient T-Scale Score : 45.44 (10/24/21 1628)  Mobility Inpatient CMS 0-100% Score: 41.77 (10/24/21 1628)  Mobility Inpatient CMS G-Code Modifier : CK (10/24/21 1628)       Goals  Short term goals  Time Frame for Short term goals: 10 visits  Short term goal 1: transfers with SBA  Short term goal 2: amb 75 ft with a RW x SBA with NWB R LE  Short term goal 3: ascend/descend 4 steps with SBA  Short term goal 4: 20 min exercise program x SBA  Patient Goals   Patient goals : Return home     Therapy Time   Individual Concurrent Group Co-treatment   Time In 0840         Time Out 7798         Minutes 25             1 of [address], PT

## 2021-10-24 NOTE — PROGRESS NOTES
Sent 2 perfect serve messages to the on call NP regarding pt having heartburn, messages were not read. Perfect serve then sent to MD on call regarding issues.

## 2021-10-24 NOTE — OP NOTE
PODIATRY OP NOTE    PATIENT NAME: Jeffrey Brody  YOB: 1940  -  78 y.o. male  MRN: [id number]  DATE: 10/23/2021  BILLING #: [id number]    Surgeon(s):  Delroy Rico DPM     ASSISTANTS: Julian Dodson DPM PGY-1    PRE-OP DIAGNOSIS:   1. Osteomyelitis, right foot  2. Equinas, right foot    POST-OP DIAGNOSIS: Same as above. PROCEDURE:   1. Transmetatarsal amputation, right foot  2. Achilles tendon lengthening, right lower extremity  3. Debridement of non-viable tissue and bone , right foot    ANESTHESIA: MAC with local    HEMOSTASIS: Pneumatic ankle tourniquet @ 250 mmHg for 46 minutes. ESTIMATED BLOOD LOSS: Less than 10cc. MATERIALS:   * No implants in log *    INJECTABLES: 20cc 1:1 mix of 0.5% marcaine plain and 1% lidocaine plain      SPECIMEN:   ID Type Source Tests Collected by Time Destination   1 : RIGHT 4 TH METATARSAL FOOT  Tissue Foot CULTURE, FUNGUS, CULTURE, ANAEROBIC AND AEROBIC Delroy Rico DPM 10/23/2021 1514    A : DISTAL RIGHT FOOT  Tissue Foot SURGICAL PATHOLOGY Delroy Rico DPM 10/23/2021 1509    B : RIGHT 4 TH METATARSAL RIGHT FOOT  Tissue Foot SURGICAL PATHOLOGY Delroy Rico DPM 10/23/2021 0927        COMPLICATIONS: none    FINDINGS: Right fourth ray was found to be necrotic and soft. Remnant metatarsal bones were found to be hard with healthy bone marrow. INDICATIONS FOR PROCEDURE: Patient has had an ulcer on right subfourth metatarsal head for quite some time. Patient has had previous amputation on the same foot. Patient has failed conservative treatments and elected to undergo surgical amputation. Patient also was found to have equinus deformity upon evaluation. Risks and benefits were discussed. No guarantees were given or implied. Consent is signed and in the chart.      PROCEDURE IN DETAIL: The patient was transported from pre-op to the operating room and placed on the operating table in the supine position with a safety strap across the lap. After adequate sedation by the Anesthesia, a 20 cc ankle block of 1:1 mix of 0.5% marcaine plain and 1% lidocaine plain was injected. The foot was then prepped and draped in the usual aseptic fashion. Attention was then directed to the right foot. There was noted to be an ulcer at subfourth metatarsal head. The ulcer is noted to be probe to bone. There is purulent drainage noted. A #15 blade was used to create two converging semieliptical incisions around forefoot. Incision was deepened to the level of bone. A thick and appropriate plantar flap was created utilizing sharp dissection. Each metatarsal was freed from surrounding soft tissue attachments utilizing sharp dissection and electrocautery. Each metatarsal was then transected utilizing a oscillating sagittal saw and the distal metatarsals were excised from medial to lateral. Care was taken to maintain the metatarsal parabola. After each metatarsal was cut the distal forefoot was removed and passed from the table. It was noted that the remnant of the all metatarsal bone was particularly soft and darkened. The metatarsals were resected until healthy marrow cavity was noted. A proximal clearance fragment was taken and sent to pathology and micro. The remaining surgical wound was then meticulously inspected to assess for remaining infection, determine viability, and look for any pulsatile bleeding vessels. The extensor and flexor tendons were resected as proximal as possible. No pulsatile bleeding vessels were visualized. Adequate bleeding and tissue perfusion noted to remaining tissue. The remaining soft tissues appear viable with bleeding noted and without signs of infection or necrosis. There was noted to be redundant plantar flap, soft tissue and skin edges were remodeled to facilitate appropriate approximation of flap edges. The plantar flap was directed over the metatarsal shafts and sutured dorsally with cool Vicryl, 3-0 Monocryl, 3-0 nylon. Attention was directed to the posterior Achilles tendon of right foot. 3 stab incisions were made spaced 2cm apart from distal to proximal. 2 medial, 1 lateral incision. The foot was maintained in a dorsiflexed position. A #15 blade was inserted into each incision to transected approximately 50% of the width of the Achilles tendon. Tension on the tendon was noted to be reduced with each maneuver. Following the tendo-achilles lengthening there was noted to be increased dorsiflexion of the ankle joint. Resistance to end-range dorsiflexion of the ankle joint was noted to be reduced and the Achilles tendon prominence was palpated to ensure that a complete rupture had not occurred. Dressings were then applied consisting of adaptic, gauze, 4 x 4's, Kerlix, Ace and posterior splint. The patient tolerated the above procedure and anesthesia well without complications. The patient was transported from the operating room to the PACU with vital signs stable and vascular status intact to the right foot. The patient was counseled at length about the risks of greg Covid-19 during their perioperative period and any recovery window from their procedure. The patient was made aware that greg Covid-19  may worsen their prognosis for recovering from their procedure  and lend to a higher morbidity and/or mortality risk. All material risks, benefits, and reasonable alternatives including postponing the procedure were discussed. The patient does wish to proceed with the procedure at this time.     Osmel Ruiz DPM   Podiatric Medicine & Surgery   10/23/2021 at 10:16 PM

## 2021-10-25 LAB
ABSOLUTE EOS #: 0.11 K/UL (ref 0–0.44)
ABSOLUTE IMMATURE GRANULOCYTE: 0.12 K/UL (ref 0–0.3)
ABSOLUTE LYMPH #: 2.9 K/UL (ref 1.1–3.7)
ABSOLUTE MONO #: 1.39 K/UL (ref 0.1–1.2)
ALBUMIN SERPL-MCNC: 3.1 G/DL (ref 3.5–5.2)
ANION GAP SERPL CALCULATED.3IONS-SCNC: 12 MMOL/L (ref 9–17)
BASOPHILS # BLD: 0 % (ref 0–2)
BASOPHILS ABSOLUTE: 0.06 K/UL (ref 0–0.2)
BUN BLDV-MCNC: 17 MG/DL (ref 8–23)
BUN/CREAT BLD: ABNORMAL (ref 9–20)
CALCIUM SERPL-MCNC: 8.6 MG/DL (ref 8.6–10.4)
CHLORIDE BLD-SCNC: 104 MMOL/L (ref 98–107)
CO2: 19 MMOL/L (ref 20–31)
CREAT SERPL-MCNC: 1.02 MG/DL (ref 0.7–1.2)
CULTURE: ABNORMAL
DIFFERENTIAL TYPE: ABNORMAL
DIRECT EXAM: ABNORMAL
EOSINOPHILS RELATIVE PERCENT: 1 % (ref 1–4)
GFR AFRICAN AMERICAN: >60 ML/MIN
GFR NON-AFRICAN AMERICAN: >60 ML/MIN
GFR SERPL CREATININE-BSD FRML MDRD: ABNORMAL ML/MIN/{1.73_M2}
GFR SERPL CREATININE-BSD FRML MDRD: ABNORMAL ML/MIN/{1.73_M2}
GLUCOSE BLD-MCNC: 116 MG/DL (ref 70–99)
GLUCOSE BLD-MCNC: 119 MG/DL (ref 75–110)
GLUCOSE BLD-MCNC: 120 MG/DL (ref 75–110)
GLUCOSE BLD-MCNC: 162 MG/DL (ref 75–110)
GLUCOSE BLD-MCNC: 94 MG/DL (ref 75–110)
HCT VFR BLD CALC: 40.6 % (ref 40.7–50.3)
HEMOGLOBIN: 12.4 G/DL (ref 13–17)
IMMATURE GRANULOCYTES: 1 %
LYMPHOCYTES # BLD: 18 % (ref 24–43)
Lab: ABNORMAL
Lab: ABNORMAL
MAGNESIUM: 2.4 MG/DL (ref 1.6–2.6)
MCH RBC QN AUTO: 29.5 PG (ref 25.2–33.5)
MCHC RBC AUTO-ENTMCNC: 30.5 G/DL (ref 28.4–34.8)
MCV RBC AUTO: 96.7 FL (ref 82.6–102.9)
MONOCYTES # BLD: 9 % (ref 3–12)
NRBC AUTOMATED: 0 PER 100 WBC
PDW BLD-RTO: 14.1 % (ref 11.8–14.4)
PHOSPHORUS: 2.9 MG/DL (ref 2.5–4.5)
PLATELET # BLD: 456 K/UL (ref 138–453)
PLATELET ESTIMATE: ABNORMAL
PMV BLD AUTO: 9.5 FL (ref 8.1–13.5)
POTASSIUM SERPL-SCNC: 4 MMOL/L (ref 3.7–5.3)
RBC # BLD: 4.2 M/UL (ref 4.21–5.77)
RBC # BLD: ABNORMAL 10*6/UL
SEG NEUTROPHILS: 72 % (ref 36–65)
SEGMENTED NEUTROPHILS ABSOLUTE COUNT: 11.5 K/UL (ref 1.5–8.1)
SODIUM BLD-SCNC: 135 MMOL/L (ref 135–144)
SPECIMEN DESCRIPTION: ABNORMAL
SPECIMEN DESCRIPTION: ABNORMAL
VANCOMYCIN RANDOM DATE LAST DOSE: NORMAL
VANCOMYCIN RANDOM DOSE AMOUNT: NORMAL
VANCOMYCIN RANDOM TIME LAST DOSE: NORMAL
VANCOMYCIN RANDOM: 28.5 UG/ML
WBC # BLD: 16.1 K/UL (ref 3.5–11.3)
WBC # BLD: ABNORMAL 10*3/UL

## 2021-10-25 PROCEDURE — 1200000000 HC SEMI PRIVATE

## 2021-10-25 PROCEDURE — 6370000000 HC RX 637 (ALT 250 FOR IP): Performed by: NURSE PRACTITIONER

## 2021-10-25 PROCEDURE — 6370000000 HC RX 637 (ALT 250 FOR IP): Performed by: PODIATRIST

## 2021-10-25 PROCEDURE — 99232 SBSQ HOSP IP/OBS MODERATE 35: CPT | Performed by: INTERNAL MEDICINE

## 2021-10-25 PROCEDURE — 80069 RENAL FUNCTION PANEL: CPT

## 2021-10-25 PROCEDURE — 99222 1ST HOSP IP/OBS MODERATE 55: CPT | Performed by: PHYSICAL MEDICINE & REHABILITATION

## 2021-10-25 PROCEDURE — 6360000002 HC RX W HCPCS: Performed by: PODIATRIST

## 2021-10-25 PROCEDURE — 6370000000 HC RX 637 (ALT 250 FOR IP): Performed by: INTERNAL MEDICINE

## 2021-10-25 PROCEDURE — 80202 ASSAY OF VANCOMYCIN: CPT

## 2021-10-25 PROCEDURE — 94760 N-INVAS EAR/PLS OXIMETRY 1: CPT

## 2021-10-25 PROCEDURE — 85025 COMPLETE CBC W/AUTO DIFF WBC: CPT

## 2021-10-25 PROCEDURE — 2580000003 HC RX 258: Performed by: PODIATRIST

## 2021-10-25 PROCEDURE — 83735 ASSAY OF MAGNESIUM: CPT

## 2021-10-25 PROCEDURE — 36415 COLL VENOUS BLD VENIPUNCTURE: CPT

## 2021-10-25 PROCEDURE — 82947 ASSAY GLUCOSE BLOOD QUANT: CPT

## 2021-10-25 RX ORDER — SODIUM PHOSPHATE, DIBASIC AND SODIUM PHOSPHATE, MONOBASIC 7; 19 G/133ML; G/133ML
1 ENEMA RECTAL
Status: ACTIVE | OUTPATIENT
Start: 2021-10-25 | End: 2021-10-25

## 2021-10-25 RX ADMIN — PIPERACILLIN AND TAZOBACTAM 3375 MG: 3; .375 INJECTION, POWDER, FOR SOLUTION INTRAVENOUS at 04:06

## 2021-10-25 RX ADMIN — CARVEDILOL 12.5 MG: 12.5 TABLET, FILM COATED ORAL at 08:36

## 2021-10-25 RX ADMIN — POLYETHYLENE GLYCOL 3350 17 G: 17 POWDER, FOR SOLUTION ORAL at 08:36

## 2021-10-25 RX ADMIN — PIPERACILLIN AND TAZOBACTAM 3375 MG: 3; .375 INJECTION, POWDER, FOR SOLUTION INTRAVENOUS at 11:44

## 2021-10-25 RX ADMIN — ENOXAPARIN SODIUM 40 MG: 40 INJECTION SUBCUTANEOUS at 08:36

## 2021-10-25 RX ADMIN — SENNOSIDES 17.2 MG: 8.6 TABLET, FILM COATED ORAL at 19:51

## 2021-10-25 RX ADMIN — CARVEDILOL 12.5 MG: 12.5 TABLET, FILM COATED ORAL at 17:15

## 2021-10-25 RX ADMIN — VALSARTAN 80 MG: 80 TABLET, FILM COATED ORAL at 08:36

## 2021-10-25 RX ADMIN — VANCOMYCIN HYDROCHLORIDE 1500 MG: 10 INJECTION, POWDER, LYOPHILIZED, FOR SOLUTION INTRAVENOUS at 01:32

## 2021-10-25 RX ADMIN — BISACODYL 5 MG: 5 TABLET, COATED ORAL at 19:51

## 2021-10-25 RX ADMIN — SODIUM CHLORIDE, PRESERVATIVE FREE 10 ML: 5 INJECTION INTRAVENOUS at 08:36

## 2021-10-25 RX ADMIN — PIPERACILLIN AND TAZOBACTAM 3375 MG: 3; .375 INJECTION, POWDER, FOR SOLUTION INTRAVENOUS at 19:53

## 2021-10-25 NOTE — PROGRESS NOTES
[facility] - Progress Note    Today's Date and Time: 10/25/2021, 5:47 PM    Impression :   1. Right foot deep plantar ulceration extending to the fourth MTP joint with septic fourth MTP joint and osteomyelitis of the fourth metatarsal and proximal phalanx of the fourth toe  2. S/P TMA Rt foot 10-23-21  3. Diabetes mellitus with associated neuropathy  4. Gastroesophageal reflux disease    Recommendations:   · Continue intravenous antimicrobial therapy with Zosyn and vancomycin. · The patient is status post transmetatarsal amputation of the right foot. · Given that the site of infection has been removed in its entirety the patient should likely be okay to discharge on oral antimicrobial therapy in the next 48-72 hours     Medical Decision Making/Summary/Discussion:10/25/2021     ·   Infection Control Recommendations   · Belk Precautions  Antimicrobial Stewardship Recommendations     · Targeted therapy  · IV to oral conversion  · PK dosing    Coordination of Outpatient Care:   · Estimated Length of IV antimicrobials: TBD  · Patient will need Midline Catheter Insertion: No  · Patient will need PICC line Insertion:No  · Patient will need: Home IV , [place],  SNF,  LTAC: TBD  · Patient will need outpatient wound care:Yes    Chief complaint/reason for consultation:   · Diabetic foot infection      History of Present Illness:   Francy Hines is a 78y.o.-year-old  male who was initially admitted on 10/21/2021. Patient seen at the request of Dr. Shields Fus:    Patient is an 78-year-old male who has a history of diabetes mellitus with associated neuropathy, gastroesophageal reflux disease, hypertension, prior bouts of osteomyelitis, bilateral hip fractures requiring open reduction internal fixation and he does have a limb length discrepancy right greater than left which has led him to get calluses and ulcerations.     The patient has a chronic right foot ulceration and he had a debrided at the wound care center and it was recommended that he be admitted for an MRI of his foot to evaluate for osteomyelitis and to be treated with IV antibiotic therapy. The patient had an MRI of the foot that showed a deep ulceration extending to the fourth MTP joint with septic fourth MTP joint and osteomyelitis of the entirety of the fourth metatarsal and proximal phalanx of the fourth toe. There were associated destructive changes of the fourth metatarsal head and neck.     The patient is to be taken to the operating room for transmetatarsal amputation of the right foot with Achilles tendon lengthening of the right lower extremity and debridement of nonviable tissue and bone in the right foot on 10-23-21     We are asked to evaluate and help with antibiotic choice. CURRENT EVALUATION : 10/25/2021      Afebrile  VS stable    The patient seen and evaluated at bedside. Patient is better with no new acute issues or concerns today. Has tolerated antibiotics well  Will likely switch to po antibiotics once incision shows signs of healing        Labs, X rays reviewed: 10/25/2021    BUN: 17  Cr: 1.02    WBC: 16.1  Hb: 12.4  Plat: 456    Cultures:  Urine:  ·   Blood:  ·   Sputum :  ·   Wound:  · 10-22-21: MRSA, B fragilis    Discussed with patient, RN. I have personally reviewed the past medical history, past surgical history, medications, social history, and family history, and I have updated the database accordingly. Past Medical History:     Past Medical History:   Diagnosis Date    Arm fracture     Back injury     Eczema     Femur fracture (HCC)     GERD (gastroesophageal reflux disease)     Glaucoma     Hypertension     Idiopathic peripheral neuropathy 9/25/2017    MRSA (methicillin resistant staph aureus) culture positive 04/11/2018    foot    MSSA (methicillin susceptible Staphylococcus aureus)     Osteomyelitis ([facility] [address] 04/2018    right foot.  Unable to treat with IV antibiotics, pt out of town on family matter    Shoulder fracture     Ulcerated, foot ([address]     wound       Past Surgical  History:     Past Surgical History:   Procedure Laterality Date    ACHILLES TENDON SURGERY Right 10/23/2021    ACHILLES TENDON LENGTHENING REPAIR performed by Elsie Bailey DPM at [address] COLONOSCOPY      FOOT AMPUTATION Right 10/23/2021    TRANSMETATARSAL AMPUTATION RIGHT FOOT performed by Elsie Bailey DPM at [address] [facility] [address] Right 10/23/2021    Transmetatarsal amputation/Achilles tendon lengthening, right lower extremity/Debridement of non-viable tissue and bone , right foot    HIP SURGERY      ORIF left hip       Medications:      [START ON 10/26/2021] vancomycin  1,500 mg IntraVENous Q24H    polyethylene glycol  17 g Oral Daily    carvedilol  12.5 mg Oral BID WC    senna  2 tablet Oral Nightly    valsartan  80 mg Oral Daily    sodium chloride flush  5-40 mL IntraVENous 2 times per day    enoxaparin  40 mg SubCUTAneous Daily    piperacillin-tazobactam  3,375 mg IntraVENous Q8H    vancomycin (VANCOCIN) intermittent dosing (placeholder)   Other RX Placeholder       Social History:     Social History     Socioeconomic History    Marital status:       Spouse name: Not on file    Number of children: Not on file    Years of education: Not on file    Highest education level: Not on file   Occupational History    Not on file   Tobacco Use    Smoking status: Never Smoker    Smokeless tobacco: Never Used   Substance and Sexual Activity    Alcohol use: No    Drug use: No    Sexual activity: Not on file   Other Topics Concern    Not on file   Social History Narrative    Not on file     Social Determinants of Health     Financial Resource Strain:     Difficulty of Paying Living Expenses:    Food Insecurity:     Worried About Running Out of Food in the Last Year:     [address] in the Last Year: Transportation Needs:     Lack of Transportation (Medical):  Lack of Transportation (Non-Medical):    Physical Activity:     Days of Exercise per Week:     Minutes of Exercise per Session:    Stress:     Feeling of Stress :    Social Connections:     Frequency of Communication with Friends and Family:     Frequency of Social Gatherings with Friends and Family:     Attends [employer] Services:     Active Member of Clubs or Organizations:     Attends Club or Organization Meetings:     Marital Status:    Intimate Partner Violence:     Fear of Current or Ex-Partner:     Emotionally Abused:     Physically Abused:     Sexually Abused:        Family History:   No family history on file. Allergies:   Patient has no known allergies. Review of Systems:   Constitutional: No fevers or chills. No systemic complaints  Head: No headaches  Eyes: No double vision or blurry vision. No conjunctival inflammation. ENT: No sore throat or runny nose. . No hearing loss, tinnitus or vertigo. Cardiovascular: No chest pain or palpitations. No shortness of breath. No ZABALA  Lung: No shortness of breath or cough. No sputum production  Abdomen: No nausea, vomiting, diarrhea, or abdominal pain. [place] No cramps. Genitourinary: No increased urinary frequency, or dysuria. No hematuria. No suprapubic or CVA pain  Musculoskeletal: No muscle aches or pains. No joint effusions, swelling or deformities. Rt TMA  Hematologic: No bleeding or bruising. Neurologic: No headache, weakness, numbness, or tingling. Integument: No rash, no ulcers. Psychiatric: No depression. Endocrine: No polyuria, no polydipsia, no polyphagia. Physical Examination :     No data found. General Appearance: Awake, alert, and in no apparent distress  Head:  Normocephalic, no trauma  Eyes: Pupils equal, round, reactive to light and accommodation; extraocular movements intact; sclera anicteric; conjunctivae pink. No embolic phenomena.   ENT: Oropharynx clear, without erythema, exudate, or thrush. No tenderness of sinuses. Mouth/throat: mucosa pink and moist. No lesions. Dentition in good repair. Neck:Supple, without lymphadenopathy. Thyroid normal, No bruits. Pulmonary/Chest: Clear to auscultation, without wheezes, rales, or rhonchi. No dullness to percussion. Cardiovascular: Regular rate and rhythm without murmurs, rubs, or gallops. Abdomen: Soft, non tender. Bowel sounds normal. No organomegaly  All four Extremities: No cyanosis, clubbing, edema, or effusions. Rt TMA  Neurologic: No gross sensory or motor deficits. Skin: Warm and dry with good turgor. Signs of peripheral arterial insufficiency. Rt TMA    Medical Decision Making -Laboratory:   I have independently reviewed/ordered the following labs:    CBC with Differential:   Recent Labs     10/24/21  0825 10/25/21  0546   WBC 18.8* 16.1*   HGB 13.0 12.4*   HCT 41.5 40.6*    456*   LYMPHOPCT 7* 18*   MONOPCT 6 9     BMP:   Recent Labs     10/24/21  0820 10/25/21  0546   * 135   K 4.5 4.0    104   CO2 16* 19*   BUN 14 17   CREATININE 0.88 1.02   MG 2.4 2.4     Hepatic Function Panel:   Recent Labs     10/24/21  0820 10/25/21  0546   LABALBU 3.0* 3.1*     No results for input(s): RPR in the last 72 hours. No results for input(s): HIV in the last 72 hours. No results for input(s): BC in the last 72 hours. Lab Results   Component Value Date    RBC 4.20 10/25/2021    WBC 16.1 10/25/2021     Lab Results   Component Value Date    CREATININE 1.02 10/25/2021    GLUCOSE 116 10/25/2021       Medical Decision Making-Imaging:       Medical Decision Ovnqhp-Pymnhsrj-Bncmb:     Component Collected Lab   Specimen Description 10/22/2021 12:25  [address]   . WOUND RIGHT    Special Requests 10/22/2021 12:25  [address]   NOT REPORTED    Direct Exam Abnormal  10/22/2021 12:25  [address]    Direct Exam Abnormal  10/22/2021 12:25 AM [address] NEGATIVE RODS    Direct Exam Abnormal  10/22/2021 12:25  [address]   FEW GRAM POSITIVE COCCOBACILLI    Culture Abnormal  10/22/2021 12:25  [address] RESISTANT STAPHYLOCOCCUS AUREUS SCANT GROWTH    Culture 10/22/2021 12:[address]  [address]   NORMAL CHERI    Culture Abnormal  10/22/2021 12:25  [address]   BACTEROIDES FRAGILIS BETA LACTAMASE POSITIVE LIGHT GROWTH    Testing Performed By        Medical Decision Making-Other:     Note:  · Labs, medications, radiologic studies were reviewed with personal review of films  · Moderate to Large amounts of data were reviewed  · Discussed with nursing Staff, Discharge planner  · Infection Control and Prevention measures reviewed  · All prior entries were reviewed  · Administer medications as ordered  · Prognosis: Fair  · Discharge planning reviewed  · Follow up as outpatient. Thank you for allowing us to participate in the care of this patient. Please call with questions.     Milo León MD  Pager: [phone] - Office: [phone]

## 2021-10-25 NOTE — CARE COORDINATION
Dr Rickey Zendejas relates pt is choosing to go to [facility] r/t no help at home, cannot bear weight on foot and has dressing changes along with the need for pt/ot    1056 Met with patient at bedside his choice is [facility], referral sent, Dr Rickey Zendejas perfect served for pt/ot orders. PT note in chart from yesterday but no ongoing orders noted    1102 Call to [facility] at [facility], she will follow through with referral and will be up later today to see pt    [phone] Call from [facility] at [facility] they do not want to take the patient back as he had behavioral issues his last visit there, the pt was notified he would need to pick another SNF, he was angry they would not take him back and wants to go home with home care, notified he would only be getting nursing 2-3 times a week, he is agreeable to home therapy and nursing. Referral to [id number] sent.  Dr Rickey Zendejas notified needed [address] and home care order    [phone] Spoke with Vimal Cabezas at [address] intake, questions answered and she will be getting back in contact with us regarding acceptance or denial    [phone] Call from Vimal Cabezas at [facility], they are able to accept pt when discharged, will need [facility] [place]. 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

## 2021-10-25 NOTE — PLAN OF CARE
Problem: Falls - Risk of:  Goal: Will remain free from falls  Description: Will remain free from falls  Outcome: Ongoing  Goal: Absence of physical injury  Description: Absence of physical injury  Outcome: Ongoing     Problem: SAFETY  Goal: Free from accidental physical injury  Outcome: Ongoing  Goal: Free from intentional harm  Outcome: Ongoing     Problem: DAILY CARE  Goal: Daily care needs are met  Outcome: Ongoing     Problem: PAIN  Goal: Patient's pain/discomfort is manageable  Outcome: Ongoing     Problem: SKIN INTEGRITY  Goal: Skin integrity is maintained or improved  Outcome: Ongoing     Problem: KNOWLEDGE DEFICIT  Goal: Patient/S.O. demonstrates understanding of disease process, treatment plan, medications, and discharge instructions.   Outcome: Ongoing     Problem: DISCHARGE BARRIERS  Goal: Patient's continuum of care needs are met  Outcome: Ongoing     Problem: Musculor/Skeletal Functional Status  Goal: Highest potential functional level  Outcome: Ongoing

## 2021-10-25 NOTE — CONSULTS
Physical Medicine & Rehabilitation  Consult Note      Admitting Physician:   Reva Larose DO    Primary Care Provider:   Loki Gan MD     Reason for Consult:  Acute Inpatient Rehabilitation    Chief Complaint: Wound check    History of Present Illness:  Referring Provider is requesting an evaluation for appropriate placement upon discharge from acute care. History from chart review and patient. Francy Hines is a 78 y.o. RHD male admitted to [facility]  on 10/21/2021. Patient admitted to ED with chronic foot wounds and history osteomyelitis - plantar wound R foot for the past 3 days. His wound clinic recommended admission for MRI and antibiotic treatment. Diagnostic studies confirmed acute osteomyelitis of the 4th metatarsophalangeal joint and adjacent bony structures with acute on chronic osteomyelitis of the distal 2nd phalanx. Podiatry was consulted. He has noted limb length discrepancy R>L and has orthotics for offloading/ulcer prevention. He has had multiple previous toe amputations. Dr. Moise Kauffman performed R transmetatarsal amputation and Achilles lengthening with debridement R foot on 10/23/21. He reports pain is well controlled. He would like to speak with podiatry about changing the posterior splint. He remains NWB RLE. He is requesting discharge home.     Review of Systems:  Constitutional: negative for anorexia, chills, fatigue, fevers, sweats and weight loss  Eyes: negative for redness and visual disturbance  Ears, nose, mouth, throat, and face: negative for earaches, sore throat and tinnitus  Respiratory: negative for cough and shortness of breath  Cardiovascular: negative for chest pain, dyspnea and palpitations  Gastrointestinal: negative for abdominal pain, change in bowel habits, constipation, nausea and vomiting  Genitourinary:negative for dysuria, frequency, hesitancy and urinary incontinence  Integument/breast: negative for pruritus and rash  Musculoskeletal:negative for stiff joints  Neurological: negative for dizziness, headaches   Behavioral/Psych: negative for decreased appetite, depression and fatigue       Premorbid function:  Independent    Current function:    PT:  Restrictions/Precautions: Weight Bearing  Right Lower Extremity Weight Bearing: Non Weight Bearing   Transfers  Sit to Stand: Contact guard assistance  Stand to sit: Contact guard assistance  Ambulation 1  Surface: level tile  Device: Rolling Walker  Assistance: Minimal assistance  Distance: amb 20 ft with a RW x min assit with NWB R LE    Transfers  Sit to Stand: Contact guard assistance  Stand to sit: Contact guard assistance  Ambulation  Ambulation?: Yes  Ambulation 1  Surface: level tile  Device: Rolling Walker  Assistance: Minimal assistance  Distance: amb 20 ft with a RW x min assit with NWB R LE    Surface: level tile  Ambulation 1  Surface: level tile  Device: Rolling Walker  Assistance: Minimal assistance  Distance: amb 20 ft with a RW x min assit with NWB R LE      OT:                          Bed mobility  Supine to Sit: Contact guard assistance  Sit to Supine: Contact guard assistance  Scooting: Contact guard assistance                    SLP:      Past Medical History:        Diagnosis Date    Arm fracture     Back injury     Eczema     Femur fracture (HCC)     GERD (gastroesophageal reflux disease)     Glaucoma     Hypertension     Idiopathic peripheral neuropathy 9/25/2017    MRSA (methicillin resistant staph aureus) culture positive 04/11/2018    foot    MSSA (methicillin susceptible Staphylococcus aureus)     Osteomyelitis ([facility] [address] 04/2018    right foot.  Unable to treat with IV antibiotics, pt out of town on family matter    Shoulder fracture     Ulcerated, foot ([facility] [address]     wound       Past Surgical History:        Procedure Laterality Date    BACK SURGERY      COLONOSCOPY      FOOT SURGERY Right 10/23/2021    Transmetatarsal amputation/Achilles tendon lengthening, right lower extremity/Debridement of non-viable tissue and bone , right foot    HIP SURGERY      ORIF left hip       Allergies:    No Known Allergies     Current Medications:   Current Facility-Administered Medications: calcium carbonate (TUMS) chewable tablet 500 mg, 500 mg, Oral, TID PRN  polyethylene glycol (GLYCOLAX) packet 17 g, 17 g, Oral, Daily  bisacodyl (DULCOLAX) EC tablet 5 mg, 5 mg, Oral, Daily PRN  traZODone (DESYREL) tablet 50 mg, 50 mg, Oral, Nightly PRN  [COMPLETED] vancomycin (VANCOCIN) 2,000 mg in dextrose 5 % 500 mL IVPB, 2,000 mg, IntraVENous, Once **FOLLOWED BY** vancomycin (VANCOCIN) 1500 mg in dextrose 5 % 250 mL IVPB, 1,500 mg, IntraVENous, Q18H  carvedilol (COREG) tablet 12.5 mg, 12.5 mg, Oral, BID WC  oxyCODONE (ROXICODONE) immediate release tablet 5 mg, 5 mg, Oral, Q4H PRN **OR** oxyCODONE (ROXICODONE) immediate release tablet 10 mg, 10 mg, Oral, Q4H PRN  senna (SENOKOT) tablet 17.2 mg, 2 tablet, Oral, Nightly  valsartan (DIOVAN) tablet 80 mg, 80 mg, Oral, Daily  sodium chloride flush 0.9 % injection 10 mL, 10 mL, IntraVENous, PRN  sodium chloride flush 0.9 % injection 5-40 mL, 5-40 mL, IntraVENous, 2 times per day  sodium chloride flush 0.9 % injection 10 mL, 10 mL, IntraVENous, PRN  0.9 % sodium chloride infusion, 25 mL, IntraVENous, PRN  potassium chloride (KLOR-CON M) extended release tablet 40 mEq, 40 mEq, Oral, PRN **OR** potassium bicarbonate (K-LYTE) disintegrating tablet 50 mEq, 50 mEq, Oral, PRN **OR** potassium chloride 10 mEq/100 mL IVPB (Peripheral Line), 10 mEq, IntraVENous, PRN  magnesium sulfate 1000 mg in dextrose 5% 100 mL IVPB, 1,000 mg, IntraVENous, PRN  enoxaparin (LOVENOX) injection 40 mg, 40 mg, SubCUTAneous, Daily  ondansetron (ZOFRAN-ODT) disintegrating tablet 4 mg, 4 mg, Oral, Q8H PRN **OR** ondansetron (ZOFRAN) injection 4 mg, 4 mg, IntraVENous, Q6H PRN  acetaminophen (TYLENOL) tablet 650 mg, 650 mg, Oral, Q6H PRN **OR** acetaminophen (TYLENOL) suppository 650 mg, 650 mg, of Communication with Friends and Family:     Frequency of Social Gatherings with Friends and Family:     Attends Faith Services:     Active Member of Clubs or Organizations:     Attends Club or Organization Meetings:     Marital Status:    Intimate Partner Violence:     Fear of Current or Ex-Partner:     Emotionally Abused:     Physically Abused:     Sexually Abused:        Family History:   No family history on file. Diagnostics:    MRI R FOOT 10/22/21  FINDINGS:   BONE MARROW: There is diffuse marrow edema, confluent decreased T1 signal   loss, and postcontrast enhancement throughout the 4th metatarsal which   becomes more pronounced distally with destructive changes present at the 4th   metatarsal head and neck.  Similar edema, confluent decreased T1 signal loss,   and postcontrast enhancement of the proximal phalanx of the 4th toe also   compatible with osteomyelitis.  Adjacent soft tissue wound present as   described below.  Patchy edema and very mild postcontrast enhancement at the   distal aspect of the residual 3rd metatarsal which is most suggestive of   early changes of osteomyelitis given surrounding soft tissue findings. Partial amputations of the 1st, 2nd, and 3rd rays.       JOINTS: Septic 4th MTP joint with associated destructive changes, effusion,   and gas within the joint.  Mild to moderate midfoot degenerative change.       SOFT TISSUES: Subcutaneous edema throughout the soft tissues with plantar   ulceration at the 4th MTP joint extending to the joint.  Associated soft   tissue enhancement consistent with cellulitis.  No organized drainable   collections are identified.           Impression   1. Deep ulceration extending to the 4th MTP joint with septic 4th MTP joint   and osteomyelitis of the entirety of the 4th metatarsal and proximal phalanx   of the 4th toe.  Associated destructive changes of the 4th metatarsal head   and neck.    2. Soft tissue edema and enhancement consistent with cellulitis.  No   well-defined drainable collection identified. 3. Patchy edema and mild enhancement at the distal amputation site of the   residual 3rd metatarsal which is highly suggestive of early changes of   osteomyelitis given adjacent soft tissue findings. CBC:   Recent Labs     10/24/21  0825 10/25/21  0546   WBC 18.8* 16.1*   RBC 4.40 4.20*   HGB 13.0 12.4*   HCT 41.5 40.6*   MCV 94.3 96.7   RDW 13.6 14.1    456*     BMP:   Recent Labs     10/24/21  0820 10/25/21  0546   * 135   K 4.5 4.0    104   CO2 16* 19*   PHOS 2.7 2.9   BUN 14 17   CREATININE 0.88 1.02   GLUCOSE 153* 116*      HbA1c:   Lab Results   Component Value Date    LABA1C 6.1 (H) 10/22/2021     BNP: No results for input(s): BNP in the last 72 hours. PT/INR: No results for input(s): PROTIME, INR in the last 72 hours. APTT: No results for input(s): APTT in the last 72 hours. CARDIAC ENZYMES: No results for input(s): CKMB, CKMBINDEX, TROPONINT in the last 72 hours. Invalid input(s): CKTOTAL;3  FASTING LIPID PANEL:No results found for: CHOL, HDL, TRIG  LIVER PROFILE: No results for input(s): AST, ALT, ALB, BILIDIR, BILITOT, ALKPHOS in the last 72 hours. Physical Exam:  /72   Pulse 56   Temp 97.6 °F (36.4 °C) (Oral)   Resp 18   Ht 6' 1\" (1.854 m)   Wt 215 lb 2.7 oz (97.6 kg)   SpO2 98%   BMI 28.39 kg/m²     GEN: Well developed, well nourished, no acute distress. HEENT: NCAT, PERRL, EOMI, mucous membranes pink and moist.  RESP: Lungs clear to auscultation. No rales or rhonchi. Respirations WNL and unlabored. CV: Regular rate rhythm. No murmurs, rubs, or gallops. ABD: soft, non-distended, non-tender. BS+ and equal.  NEURO: A&O x 3. Sensation intact to light touch. DTRs 2+  MSK: Functional ROM. Muscle tone and bulk are normal bilaterally. Strength 5/5 key muscles BUE and BLEs. EXT: No calf tenderness to palpation or edema BLEs. SKIN: Warm dry and intact with good turgor.  R transmetatarsal amputation with post-op dressing and posterior splint in place. PSYCH: Mood WNL. Affect WNL. Appropriately interactive. Impression:  1. Debility secondary to diabetic R  foot infection osteomyelitis/cellulitis R foot: on vancomycin/zosyn. NWB RLE in surgical shoe. Podiatry transitioning to oral antibiotics for home. 2. HTN  3. DM II with neuropathy  4. PVD  5. Limb length discrepancy    Recommendations:    1. Diagnosis:  R TMA for osteomyelitis  2. Therapy: Has PT/OT needs  3. Medical Necessity: As above  4. Support: None  5. Rehab Recommendation: Patient requesting discharge home and insisting on driving his car with his R foot despite education about weight bearing status and safety concerns about him driving with surgical foot in a splint. 6. DVT Prophylaxis: on Lovenox    It was my pleasure to evaluate Meghann Zayas today. Please call with questions. Jazz Brewster MD        This note is created with the assistance of a speech recognition program.  While intending to generate a document that actually reflects the content of the visit, the document can still have some errors including those of syntax and sound a like substitutions which may escape proof reading. In such instances, actual meaning can be extrapolated by contextual diversion.

## 2021-10-25 NOTE — PROGRESS NOTES
Progress Note  Podiatric Medicine and Surgery     Subjective     CC: right foot wound    Patient seen and examined at bedside. No acute events overnight. Afebrile, vital signs stable  Patient is status post right TMA with CHRYSTAL (DOS:10/24/21)  Patient is frustrated with placement of home health care      HPI :  Braden Addison is a 78 y.o. male with chronic right foot ulceration.  Patient states he has a history of limb length discrepancy, R>L.  He does see a podiatrist and has orthotics but does not wear them to help offload and prevent ulcereations. Patient reports has had multiple toe amputations for this issue and osteomyelitis requiring IV antibiotics.  Patient reports that the foot is sore around the wound that he has had for the last 3 days and that he also has peripheral neuropathy.  He denies seeing a vascular surgeon or having any vascular work-up in the past. Per chart review, was seen at a wound care clinic today at [facility] and his foot was debrided and dressed. Per ED note, the physician there recommended he be admitted for an MRI of his foot and IV antibiotics. Patient has PMH of type 2 diabetes mellitus with neuropathy. PCP is Robert Valdivia MD    ROS: Denies N/V/F/C/SOB/CP. Otherwise negative except at stated in the HPI.      Medications:  Scheduled Meds:   polyethylene glycol  17 g Oral Daily    vancomycin  1,500 mg IntraVENous Q18H    carvedilol  12.5 mg Oral BID WC    senna  2 tablet Oral Nightly    valsartan  80 mg Oral Daily    sodium chloride flush  5-40 mL IntraVENous 2 times per day    enoxaparin  40 mg SubCUTAneous Daily    piperacillin-tazobactam  3,375 mg IntraVENous Q8H    vancomycin (VANCOCIN) intermittent dosing (placeholder)   Other RX Placeholder       Continuous Infusions:   sodium chloride Stopped (10/23/21 6021)    dextrose         PRN Meds:calcium carbonate, bisacodyl, traZODone, oxyCODONE **OR** oxyCODONE, sodium chloride flush, sodium chloride flush, sodium chloride, potassium chloride **OR** potassium alternative oral replacement **OR** potassium chloride, magnesium sulfate, ondansetron **OR** ondansetron, acetaminophen **OR** acetaminophen, glucose, dextrose, glucagon (rDNA), dextrose    Objective     Vitals:  Patient Vitals for the past 8 hrs:   BP Temp Temp src Pulse Resp SpO2   10/25/21 0700 137/72 97.6 °F (36.4 °C) Oral 56 18 98 %     Average, Min, and Max for last 24 hours Vitals:  TEMPERATURE:  Temp  Av.8 °F (36.6 °C)  Min: 97.6 °F (36.4 °C)  Max: 98.1 °F (36.7 °C)    RESPIRATIONS RANGE: Resp  Av  Min: 18  Max: 18    PULSE RANGE: Pulse  Av.3  Min: 56  Max: 75    BLOOD PRESSURE RANGE:  Systolic (22OXT), LQF:256 , Min:121 , TBT:826   ; Diastolic (61ZAR), LZY:48, Min:64, Max:72      PULSE OXIMETRY RANGE: SpO2  Av.7 %  Min: 96 %  Max: 99 %    I/O last 3 completed shifts: In: 553 [P.O.:200; I.V.:353]  Out: 550 [Urine:550]    CBC:  Recent Labs     10/24/21  0825 10/25/21  0546   WBC 18.8* 16.1*   HGB 13.0 12.4*   HCT 41.5 40.6*    456*        BMP:  Recent Labs     10/24/21  0820 10/25/21  0546   * 135   K 4.5 4.0    104   CO2 16* 19*   BUN 14 17   CREATININE 0.88 1.02   GLUCOSE 153* 116*   CALCIUM 8.7 8.6        Coags:  No results for input(s): APTT, PROT, INR in the last 72 hours. No results found for: SEDRATE  No results for input(s): CRP in the last 72 hours. Lower Extremity Physical Exam: Dressing remains intact with posterior splint due to surgery. CFT is less than 3 seconds. Passive motor function intact. Sensation intact as well. Imaging:   MRI FOOT RIGHT W WO CONTRAST   Final Result   1. Deep ulceration extending to the 4th MTP joint with septic 4th MTP joint   and osteomyelitis of the entirety of the 4th metatarsal and proximal phalanx   of the 4th toe. Associated destructive changes of the 4th metatarsal head   and neck. 2. Soft tissue edema and enhancement consistent with cellulitis.   No well-defined drainable collection identified. 3. Patchy edema and mild enhancement at the distal amputation site of the   residual 3rd metatarsal which is highly suggestive of early changes of   osteomyelitis given adjacent soft tissue findings. VL LOWER EXTREMITY ARTERIAL SEGMENTAL PRESSURES W PPG   Final Result      XR FOOT RIGHT (MIN 3 VIEWS)   Final Result   Cellulitis with associated acute osteomyelitis involving the 4th   metatarsophalangeal joint/adjacent bony structures. Acute or chronic   osteomyelitis of the distal 2nd phalanx. Cultures: Wound 10/22/21: Few GNR and few Gram positive coccobacilli, viridans strep group moderate growth    Assessment   Kelly Ro is a 78 y.o. male with   1. Diabetic foot infection, right foot  2. Osteomyelitis  3. Cellulitis, right foot  4. Limb length discrepancy  5. PVD  6. Type 2 diabetes mellitus with polyneuropathy  7. status post right TMA with CHRYSTAL (DOS:10/24/21)    Principal Problem:    Osteomyelitis of right foot (HCC)  Active Problems:    Benign essential HTN  Resolved Problems:    * No resolved hospital problems. *       Plan     · Patient examined and evaluated at bedside   · Treatment options discussed in detail with the patient  · Patient is status post right TMA with CHRYSTAL (DOS:10/24/21)  · Culture: Few GNR and few Gram positive coccobacilli, virdian strep moderate growth  · IV abx: Vanc/Zosyn per ID. Patient will be switched to oral abx to go home with  · ID consulted. Appreciate recommendation. · Medical management per internal medicine  · Dressing remains intact due to surgery. Patient will need to follow up with Dr. Esperanza Gallego one week after discharge. · Patient does not want to go to a facilty. Home health care per case management  · NWB to right lower extremity in posterior splint  · We had a lengthy discussion today.  Educated the patient on the importance of non-weight bearing with posterior splint given the extent of his surgery. He reports he has a car parked outside the hospital and wants to drive home. I emphasized again that putting weight on it would prevent healing and will put tension on the suture. Patient shows understanding. · Patient is clinically stable from podiatry standpoint. Podiatry will signoff. Please don't hesitate to perfect serve us with any questions or concerns.   · Discussed with Dr. Melida Lee, [facility]   Podiatric Medicine & Surgery   10/25/2021 at 11:22 AM

## 2021-10-25 NOTE — PROGRESS NOTES
[facility]  Office: [address], DO, Adia Muller, DO, Murali Headley, DO, [facility], DO, Kimmy Daniels MD, Brenton Moreno MD, Blessing Adame MD, Edwin Townsend MD, Mario Alberto Ocampo MD, Yenifer Hope MD, Shane Conte MD, Erin Iraheta MD, Cierra Shea, DO, Ivis Hartman DO, Nghia Chaves MD,  Annika Carter, DO, Akhil Cortez MD, Babatunde Hunt MD, Lorena Sidhu MD, Gigi Whipple MD, Aletha Lange MD, Edilma Liu MD, Mandy Segura, [facility], [facility], CNP, Mary Morales, CNP, Real Ramírez, CNS, Carol Ann Chester, CNP, Monse Shirley, CNP, Zenaida De Los Santos, CNP, Uma Baltazar, CNP, Daisy Nicole, CNP, Brown Power, CNP, Casper Baird PA-C, Dorian Gusman, [facility], Felicia Eubanks, CNP, Jearmy White, CNP, Florencio Hatfield, CNP, Rosalia Adhikari, CNP, Tamica Jc, CNP, Nicole Larsen, CNP, [facility], [address] [place] [address]    Progress Note    10/25/2021    8:25 AM    Name:   Gilma Murillo  MRN:     [id number]     Acct:      [de-identified]   Room:   [address] [place] [address] Day:  4  Admit Date:  10/21/2021  6:35 PM    PCP:   Josias Braun MD  Code Status:  Full Code    Subjective:     C/C:   Chief Complaint   Patient presents with    Wound Check     Interval History Status: improved. Patient seen and examined, we discussed possibility of oral antibiotics but cultures became positive for multiple organisms. Brief History:     From H&P  Gilma Murillo is a 78 y.o. Non- / non  male who presents with Wound Check   and is admitted to the hospital for the management of Osteomyelitis of right foot ([address]    This is a very pleasant 78 yr old male with underlying history of HTN,leg length discrepancy after a fall/femur fracture and chronic foot wounds with history of osteomyelitis and subsequent amputations who presents to the ER with a plantar aspect right foot wound that has been ongoing over the last 3 days.  Patient had initial visit today at [facility] [address] [place] [address] who recommended admission for MRI and antibiotics. He denies any CP, SOB, ABD pain, n/v/d, HA/dizziness, cough or fevers. Imaging demonstrates cellulitis with acute osteomyelitis of the 4th metatarsophalangeal joint and adjacent bony structures and acute/chronic osteomyelitis of the distal 2nd phalanx. Patient was started on broad spectrum IV antibiotics and Podiatry was was consulted from the ER. Patient is admitted to [facility] for continued management. Review of Systems:     Constitutional:  negative for chills, fevers, sweats  Respiratory:  negative for cough, dyspnea on exertion, shortness of breath, wheezing  Cardiovascular:  negative for chest pain, chest pressure/discomfort, lower extremity edema, palpitations  Gastrointestinal:  negative for abdominal pain, constipation, diarrhea, nausea, vomiting  Neurological:  negative for dizziness, headache    Medications:      Allergies:    No Known Allergies    Current Meds:   Scheduled Meds:    polyethylene glycol  17 g Oral Daily    vancomycin  1,500 mg IntraVENous Q18H    carvedilol  12.5 mg Oral BID WC    senna  2 tablet Oral Nightly    valsartan  80 mg Oral Daily    sodium chloride flush  5-40 mL IntraVENous 2 times per day    enoxaparin  40 mg SubCUTAneous Daily    piperacillin-tazobactam  3,375 mg IntraVENous Q8H    vancomycin (VANCOCIN) intermittent dosing (placeholder)   Other RX Placeholder     Continuous Infusions:    sodium chloride Stopped (10/23/21 0427)    dextrose       PRN Meds: calcium carbonate, bisacodyl, traZODone, oxyCODONE **OR** oxyCODONE, sodium chloride flush, sodium chloride flush, sodium chloride, potassium chloride **OR** potassium alternative oral replacement **OR** potassium chloride, magnesium sulfate, ondansetron **OR** ondansetron, acetaminophen **OR** acetaminophen, glucose, dextrose, glucagon (rDNA), dextrose    Data:     Past Medical History:   has a past medical history of Arm fracture, Back injury, Eczema, Femur fracture (HCC), GERD (gastroesophageal reflux disease), Glaucoma, Hypertension, Idiopathic peripheral neuropathy, MRSA (methicillin resistant staph aureus) culture positive, MSSA (methicillin susceptible Staphylococcus aureus), Osteomyelitis ([facility] [address] Shoulder fracture, and Ulcerated, foot ([facility] [address] Social History:   reports that he has never smoked. He has never used smokeless tobacco. He reports that he does not drink alcohol and does not use drugs. Family History: No family history on file. Vitals:  /64   Pulse 75   Temp 98.1 °F (36.7 °C) (Oral)   Resp 18   Ht 6' 1\" (1.854 m)   Wt 215 lb 2.7 oz (97.6 kg)   SpO2 99%   BMI 28.39 kg/m²   Temp (24hrs), Av °F (36.7 °C), Min:97.8 °F (36.6 °C), Max:98.1 °F (36.7 °C)    Recent Labs     10/23/21  0811 10/24/21  0736 10/24/21  2035 10/25/21  0812   POCGLU 112* 152* 147* 94       I/O (24Hr):     Intake/Output Summary (Last 24 hours) at 10/25/2021 08  Last data filed at 10/25/2021 0554  Gross per 24 hour   Intake 553 ml   Output 550 ml   Net 3 ml       Labs:  Hematology:  Recent Labs     10/24/21  0825 10/25/21  0546   WBC 18.8* 16.1*   RBC 4.40 4.20*   HGB 13.0 12.4*   HCT 41.5 40.6*   MCV 94.3 96.7   MCH 29.5 29.5   MCHC 31.3 30.5   RDW 13.6 14.1    456*   MPV 9.3 9.5     Chemistry:  Recent Labs     10/24/21  0820 10/25/21  0546   * 135   K 4.5 4.0    104   CO2 16* 19*   GLUCOSE 153* 116*   BUN 14 17   CREATININE 0.88 1.02   MG 2.4 2.4   ANIONGAP 14 12   LABGLOM >60 >60   GFRAA >60 >60   CALCIUM 8.7 8.6   PHOS 2.7 2.9     Recent Labs     10/22/21  1156 10/22/21  2056 10/23/21  0811 10/24/21  0736 10/24/21  0820 10/24/21  2035 10/25/21  0546 10/25/21  0812   LABAL  --   --   --   --  3.0*  --  3.1*  --    POCGLU 139* 143* 112* 152*  --  147*  --  94     ABG:No results found for: POCPH, PHART, PH, POCPCO2, WHC9OYC, PCO2, POCPO2, PO2ART, PO2, POCHCO3, KYA0MHK, HCO3, NBEA, PBEA, BEART, BE, THGBART, THB, QQL2VKN, UKHX3IHZ, K2JRDVZY, O2SAT, FIO2  Lab Results   Component Value Date/Time    SPECIAL NOT REPORTED 10/23/2021 03:35 PM     Lab Results   Component Value Date/Time    CULTURE NO GROWTH 2 DAYS 10/23/2021 03:35 PM       Radiology:  XR FOOT RIGHT (MIN 3 VIEWS)    Result Date: 10/21/2021  Cellulitis with associated acute osteomyelitis involving the 4th metatarsophalangeal joint/adjacent bony structures. Acute or chronic osteomyelitis of the distal 2nd phalanx. Physical Examination:        General appearance:  alert, cooperative and no distress  Mental Status:  oriented to person, place and time and normal affect  Lungs:  clear to auscultation bilaterally, normal effort  Heart:  regular rate and rhythm, no murmur  Abdomen:  soft, nontender, nondistended, normal bowel sounds, no masses, hepatomegaly, splenomegaly  Extremities:  no edema, redness, tenderness in the calves  Skin:  no gross lesions, rashes, induration    Assessment:        Hospital Problems         Last Modified POA    * (Principal) Osteomyelitis of right foot ([address] 10/22/2021 Yes    Benign essential HTN 10/22/2021 Yes          Plan:        1. Osteomyelitis of the right footID and podiatry following, continue vancomycin and Zosyn. Consult PMNR for rehab needs, patient is positive for Enterococcus, MRSA, B fragilis, viridans strep on wound culture. Infectious disease following for recommendations, no changes antibiotics at this time  2. Essential hypertensioncontinue home medications  3. Disposition: Spoke with case management, will initiate referral to [place] once PT and OT are working with him. Patient has multiple steps at home, lives alone and may need to use his right foot for ADLs.   Patient agreeable with SNF if needed    Yoli Javed DO  10/25/2021  8:25 AM

## 2021-10-26 VITALS
HEIGHT: 73 IN | WEIGHT: 215.17 LBS | BODY MASS INDEX: 28.52 KG/M2 | DIASTOLIC BLOOD PRESSURE: 68 MMHG | HEART RATE: 75 BPM | SYSTOLIC BLOOD PRESSURE: 121 MMHG | RESPIRATION RATE: 18 BRPM | TEMPERATURE: 98.3 F | OXYGEN SATURATION: 96 %

## 2021-10-26 PROBLEM — D72.9 NEUTROPHILIC LEUKOCYTOSIS: Status: ACTIVE | Noted: 2021-10-26

## 2021-10-26 PROBLEM — R73.02 IGT (IMPAIRED GLUCOSE TOLERANCE): Status: ACTIVE | Noted: 2021-10-26

## 2021-10-26 PROBLEM — M86.171 ACUTE OSTEOMYELITIS OF METATARSAL BONE, RIGHT (HCC): Status: ACTIVE | Noted: 2021-10-21

## 2021-10-26 LAB
ABSOLUTE EOS #: 0.34 K/UL (ref 0–0.44)
ABSOLUTE IMMATURE GRANULOCYTE: 0.11 K/UL (ref 0–0.3)
ABSOLUTE LYMPH #: 3.26 K/UL (ref 1.1–3.7)
ABSOLUTE MONO #: 1.3 K/UL (ref 0.1–1.2)
ALBUMIN SERPL-MCNC: 3.3 G/DL (ref 3.5–5.2)
ANION GAP SERPL CALCULATED.3IONS-SCNC: 12 MMOL/L (ref 9–17)
BASOPHILS # BLD: 1 % (ref 0–2)
BASOPHILS ABSOLUTE: 0.08 K/UL (ref 0–0.2)
BUN BLDV-MCNC: 17 MG/DL (ref 8–23)
BUN/CREAT BLD: ABNORMAL (ref 9–20)
CALCIUM SERPL-MCNC: 8.6 MG/DL (ref 8.6–10.4)
CHLORIDE BLD-SCNC: 105 MMOL/L (ref 98–107)
CO2: 20 MMOL/L (ref 20–31)
CREAT SERPL-MCNC: 1.07 MG/DL (ref 0.7–1.2)
DIFFERENTIAL TYPE: ABNORMAL
EOSINOPHILS RELATIVE PERCENT: 3 % (ref 1–4)
GFR AFRICAN AMERICAN: >60 ML/MIN
GFR NON-AFRICAN AMERICAN: >60 ML/MIN
GFR SERPL CREATININE-BSD FRML MDRD: ABNORMAL ML/MIN/{1.73_M2}
GFR SERPL CREATININE-BSD FRML MDRD: ABNORMAL ML/MIN/{1.73_M2}
GLUCOSE BLD-MCNC: 102 MG/DL (ref 70–99)
GLUCOSE BLD-MCNC: 102 MG/DL (ref 75–110)
GLUCOSE BLD-MCNC: 103 MG/DL (ref 75–110)
GLUCOSE BLD-MCNC: 99 MG/DL (ref 75–110)
HCT VFR BLD CALC: 42.2 % (ref 40.7–50.3)
HEMOGLOBIN: 13.1 G/DL (ref 13–17)
IMMATURE GRANULOCYTES: 1 %
LYMPHOCYTES # BLD: 24 % (ref 24–43)
MAGNESIUM: 2.3 MG/DL (ref 1.6–2.6)
MCH RBC QN AUTO: 28.5 PG (ref 25.2–33.5)
MCHC RBC AUTO-ENTMCNC: 31 G/DL (ref 28.4–34.8)
MCV RBC AUTO: 91.9 FL (ref 82.6–102.9)
MONOCYTES # BLD: 10 % (ref 3–12)
NRBC AUTOMATED: 0 PER 100 WBC
PDW BLD-RTO: 14 % (ref 11.8–14.4)
PHOSPHORUS: 2.7 MG/DL (ref 2.5–4.5)
PLATELET # BLD: 444 K/UL (ref 138–453)
PLATELET ESTIMATE: ABNORMAL
PMV BLD AUTO: 9.5 FL (ref 8.1–13.5)
POTASSIUM SERPL-SCNC: 4.1 MMOL/L (ref 3.7–5.3)
RBC # BLD: 4.59 M/UL (ref 4.21–5.77)
RBC # BLD: ABNORMAL 10*6/UL
SEG NEUTROPHILS: 61 % (ref 36–65)
SEGMENTED NEUTROPHILS ABSOLUTE COUNT: 8.54 K/UL (ref 1.5–8.1)
SODIUM BLD-SCNC: 137 MMOL/L (ref 135–144)
SURGICAL PATHOLOGY REPORT: NORMAL
WBC # BLD: 13.6 K/UL (ref 3.5–11.3)
WBC # BLD: ABNORMAL 10*3/UL

## 2021-10-26 PROCEDURE — C1769 GUIDE WIRE: HCPCS

## 2021-10-26 PROCEDURE — 85025 COMPLETE CBC W/AUTO DIFF WBC: CPT

## 2021-10-26 PROCEDURE — 2709999900 HC NON-CHARGEABLE SUPPLY

## 2021-10-26 PROCEDURE — 6370000000 HC RX 637 (ALT 250 FOR IP): Performed by: PODIATRIST

## 2021-10-26 PROCEDURE — 2580000003 HC RX 258: Performed by: PODIATRIST

## 2021-10-26 PROCEDURE — 6370000000 HC RX 637 (ALT 250 FOR IP): Performed by: INTERNAL MEDICINE

## 2021-10-26 PROCEDURE — 83735 ASSAY OF MAGNESIUM: CPT

## 2021-10-26 PROCEDURE — 36415 COLL VENOUS BLD VENIPUNCTURE: CPT

## 2021-10-26 PROCEDURE — 6360000002 HC RX W HCPCS: Performed by: PODIATRIST

## 2021-10-26 PROCEDURE — 99239 HOSP IP/OBS DSCHRG MGMT >30: CPT | Performed by: INTERNAL MEDICINE

## 2021-10-26 PROCEDURE — 99232 SBSQ HOSP IP/OBS MODERATE 35: CPT | Performed by: INTERNAL MEDICINE

## 2021-10-26 PROCEDURE — 6360000002 HC RX W HCPCS: Performed by: INTERNAL MEDICINE

## 2021-10-26 PROCEDURE — 80069 RENAL FUNCTION PANEL: CPT

## 2021-10-26 PROCEDURE — 82947 ASSAY GLUCOSE BLOOD QUANT: CPT

## 2021-10-26 RX ORDER — OXYCODONE HYDROCHLORIDE 5 MG/1
5 TABLET ORAL EVERY 4 HOURS PRN
Qty: 18 TABLET | Refills: 0 | Status: SHIPPED | OUTPATIENT
Start: 2021-10-26 | End: 2021-10-29

## 2021-10-26 RX ORDER — LINEZOLID 600 MG/1
600 TABLET, FILM COATED ORAL EVERY 12 HOURS SCHEDULED
Qty: 14 TABLET | Refills: 0 | Status: SHIPPED | OUTPATIENT
Start: 2021-10-26 | End: 2021-11-02

## 2021-10-26 RX ORDER — CANDESARTAN 4 MG/1
4 TABLET ORAL DAILY
Qty: 30 TABLET | Refills: 0 | Status: SHIPPED | OUTPATIENT
Start: 2021-10-26 | End: 2021-10-26 | Stop reason: SDUPTHER

## 2021-10-26 RX ORDER — CANDESARTAN 4 MG/1
4 TABLET ORAL DAILY
Qty: 30 TABLET | Refills: 0 | COMMUNITY
Start: 2021-10-26

## 2021-10-26 RX ORDER — LINEZOLID 600 MG/1
600 TABLET, FILM COATED ORAL EVERY 12 HOURS SCHEDULED
Status: DISCONTINUED | OUTPATIENT
Start: 2021-10-26 | End: 2021-10-26 | Stop reason: HOSPADM

## 2021-10-26 RX ORDER — CARVEDILOL 12.5 MG/1
12.5 TABLET ORAL 2 TIMES DAILY WITH MEALS
Qty: 60 TABLET | Refills: 0 | COMMUNITY
Start: 2021-10-26

## 2021-10-26 RX ORDER — IBUPROFEN 800 MG/1
800 TABLET ORAL EVERY 6 HOURS PRN
Qty: 120 TABLET | Refills: 3 | Status: SHIPPED | OUTPATIENT
Start: 2021-10-26

## 2021-10-26 RX ORDER — CARVEDILOL 12.5 MG/1
12.5 TABLET ORAL 2 TIMES DAILY WITH MEALS
Qty: 60 TABLET | Refills: 0 | Status: SHIPPED | OUTPATIENT
Start: 2021-10-26 | End: 2021-10-26

## 2021-10-26 RX ADMIN — SODIUM CHLORIDE, PRESERVATIVE FREE 10 ML: 5 INJECTION INTRAVENOUS at 08:34

## 2021-10-26 RX ADMIN — VALSARTAN 80 MG: 80 TABLET, FILM COATED ORAL at 10:47

## 2021-10-26 RX ADMIN — PIPERACILLIN AND TAZOBACTAM 3375 MG: 3; .375 INJECTION, POWDER, FOR SOLUTION INTRAVENOUS at 11:42

## 2021-10-26 RX ADMIN — CARVEDILOL 12.5 MG: 12.5 TABLET, FILM COATED ORAL at 16:59

## 2021-10-26 RX ADMIN — ENOXAPARIN SODIUM 40 MG: 40 INJECTION SUBCUTANEOUS at 08:34

## 2021-10-26 RX ADMIN — CARVEDILOL 12.5 MG: 12.5 TABLET, FILM COATED ORAL at 08:34

## 2021-10-26 RX ADMIN — PIPERACILLIN AND TAZOBACTAM 3375 MG: 3; .375 INJECTION, POWDER, FOR SOLUTION INTRAVENOUS at 03:50

## 2021-10-26 RX ADMIN — Medication 1500 MG: at 01:27

## 2021-10-26 RX ADMIN — POLYETHYLENE GLYCOL 3350 17 G: 17 POWDER, FOR SOLUTION ORAL at 08:37

## 2021-10-26 NOTE — PROGRESS NOTES
[facility]  Office: [phone]  Shayna Duane, DO, Jennyfer Fátima Blood, DO, Rahul Canela MD, Elizabeth Mauro MD, Akhil Hogan MD, Lorena Pastor MD, Ben Graham MD, Parris Tovar MD, Yazmin Quinones MD, José Zamora, DO, Shelley Haji, DO, Cezar Gupta MD,  Gena Siemens, DO, Bryson Grey MD, Lorelei Curtis MD, Nyla Agustin MD, Ashley Tim MD, Tsering Duong MD, Isis Pelaez MD, Anjelica Ochoa, [facility], [facility], CNP, Jj Arvizu, CNP, Shamika De La Torre, CNS, Rona Gant, CNP, Ez Garza, CNP, Livia Scott, CNP, Alejandrina Flores, CNP, Cedric Cm, CNP, Valeri Maria, CNP, Roylene Goodell, PA-C, Unique Mace, [facility], Carmelina De Souza, CNP, Britton Hdz, CNP, Dyan Smith, CNP, Lanette Simmons, CNP, Jennifer Jacobo, CNP, Janusz Maciel, [facility], [facility], [address] [place] [address]    Progress Note    10/26/2021    8:27 AM    Name:   Tawnya Toledo  MRN:     [id number]     Acct:      [de-identified]   Room:   [address] [place] [address] Day:  5  Admit Date:  10/21/2021  6:35 PM    PCP:   Giovanni Koehler MD  Code Status:  Full Code    Subjective:     C/C:   Chief Complaint   Patient presents with    Wound Check     Interval History Status: improved. Patient seen and examined this afternoon. Well-speaking gentleman. Eloquent with his words. States that he wants to go home. States that he does not want to go to SNF. Day #5 of Zosyn. POD #3 from TMA of right foot along with Achilles tendon lengthening and debridemnet of non-viable tissue. He states that he has a mild ache at times with his foot. Vitals are stable. Brief History:      This is a very pleasant 78 yr old male with underlying history of HTN,leg length discrepancy after a fall/femur fracture and chronic foot wounds with history of osteomyelitis and subsequent amputations who presents to the ER with a plantar aspect right foot wound that has been ongoing over the last 3 days. Patient had initial visit today at [facility] [address] [place] [address] who recommended admission for MRI and antibiotics. He denies any CP, SOB, ABD pain, n/v/d, HA/dizziness, cough or fevers. Imaging demonstrates cellulitis with acute osteomyelitis of the 4th metatarsophalangeal joint and adjacent bony structures and acute/chronic osteomyelitis of the distal 2nd phalanx. Patient was started on broad spectrum IV antibiotics and Podiatry was was consulted from the ER. Patient is admitted to [facility] for continued management. Review of Systems:     Constitutional:  negative for chills, fevers, sweats  Respiratory:  negative for cough, dyspnea on exertion, shortness of breath, wheezing  Cardiovascular:  negative for chest pain, chest pressure/discomfort, lower extremity edema, palpitations  Gastrointestinal:  negative for abdominal pain, constipation, diarrhea, nausea, vomiting  Neurological:  negative for dizziness, headache  Extremities: reports occasional ache of the right foot    Medications:      Allergies:    No Known Allergies    Current Meds:   Scheduled Meds:    vancomycin  1,500 mg IntraVENous Q24H    polyethylene glycol  17 g Oral Daily    carvedilol  12.5 mg Oral BID WC    senna  2 tablet Oral Nightly    valsartan  80 mg Oral Daily    sodium chloride flush  5-40 mL IntraVENous 2 times per day    enoxaparin  40 mg SubCUTAneous Daily    piperacillin-tazobactam  3,375 mg IntraVENous Q8H    vancomycin (VANCOCIN) intermittent dosing (placeholder)   Other RX Placeholder     Continuous Infusions:    sodium chloride Stopped (10/23/21 1557)    dextrose       PRN Meds: calcium carbonate, bisacodyl, traZODone, oxyCODONE **OR** oxyCODONE, sodium chloride flush, sodium chloride flush, sodium chloride, potassium chloride **OR** potassium alternative oral replacement **OR** potassium chloride, magnesium sulfate, ondansetron **OR** ondansetron, acetaminophen **OR** acetaminophen, glucose, dextrose, glucagon (rDNA), dextrose    Data:     Past Medical History:   has a past medical history of Arm fracture, Back injury, Eczema, Femur fracture (HCC), GERD (gastroesophageal reflux disease), Glaucoma, Hypertension, Idiopathic peripheral neuropathy, MRSA (methicillin resistant staph aureus) culture positive, MSSA (methicillin susceptible Staphylococcus aureus), Osteomyelitis ([facility] [address] Shoulder fracture, and Ulcerated, foot ([facility] [address] Social History:   reports that he has never smoked. He has never used smokeless tobacco. He reports that he does not drink alcohol and does not use drugs. Family History: No family history on file. Vitals:  BP (!) 111/56   Pulse 79   Temp 98.4 °F (36.9 °C) (Oral)   Resp 16   Ht 6' 1\" (1.854 m)   Wt 215 lb 2.7 oz (97.6 kg)   SpO2 95%   BMI 28.39 kg/m²   Temp (24hrs), Av.4 °F (36.9 °C), Min:98.4 °F (36.9 °C), Max:98.4 °F (36.9 °C)    Recent Labs     10/25/21  1155 10/25/21  1639 10/25/21  2033 10/26/21  0818   POCGLU 120* 119* 162* 103       I/O (24Hr):   No intake or output data in the 24 hours ending 10/26/21 0827    Labs:  Hematology:  Recent Labs     10/24/21  0825 10/25/21  0546 10/26/21  0449   WBC 18.8* 16.1* 13.6*   RBC 4.40 4.20* 4.59   HGB 13.0 12.4* 13.1   HCT 41.5 40.6* 42.2   MCV 94.3 96.7 91.9   MCH 29.5 29.5 28.5   MCHC 31.3 30.5 31.0   RDW 13.6 14.1 14.0    456* 444   MPV 9.3 9.5 9.5     Chemistry:  Recent Labs     10/24/21  0820 10/25/21  0546 10/26/21  0449   * 135 137   K 4.5 4.0 4.1    104 105   CO2 16* 19* 20   GLUCOSE 153* 116* 102*   BUN 14 17 17   CREATININE 0.88 1.02 1.07   MG 2.4 2.4 2.3   ANIONGAP 14 12 12   LABGLOM >60 >60 >60   GFRAA >60 >60 >60   CALCIUM 8.7 8.6 8.6   PHOS 2.7 2.9 2.7     Recent Labs     10/24/21  0736 10/24/21  0820 10/24/21  2035 10/25/21  0546 10/25/21  0812 10/25/21  1155 10/25/21  1639 10/25/21  2033 10/26/21  0449 10/26/21  0818   LABALBU  --  3.0*  --  3.1*  --   --   --   --  3.3*  --    POCGLU   < >  --  147*  --  94 120* 119* 162*  --  103    < > = values in this interval not displayed. ABG:No results found for: POCPH, PHART, PH, POCPCO2, TBS1ANV, PCO2, POCPO2, PO2ART, PO2, POCHCO3, FEV7TTV, HCO3, NBEA, PBEA, BEART, BE, THGBART, THB, STV1MXR, SXUM8ALA, C2EPGSMR, O2SAT, FIO2  Lab Results   Component Value Date/Time    SPECIAL NOT REPORTED 10/23/2021 03:35 PM    SPECIAL NOT REPORTED 10/23/2021 03:35 PM     Lab Results   Component Value Date/Time    CULTURE NO GROWTH 2 DAYS 10/23/2021 03:35 PM    CULTURE NO GROWTH 3 DAYS 10/23/2021 03:35 PM       Radiology:  XR FOOT RIGHT (MIN 3 VIEWS)    Result Date: 10/21/2021  Cellulitis with associated acute osteomyelitis involving the 4th metatarsophalangeal joint/adjacent bony structures. Acute or chronic osteomyelitis of the distal 2nd phalanx. Physical Examination:        General appearance:  alert, cooperative and no distress, elderly  gentleman who appears younger than stated age. Mental Status:  oriented to person, place and time and normal affect  Lungs:  clear to auscultation bilaterally, normal effort  Heart:  regular rate and rhythm, no murmur  Abdomen:  soft, nontender, nondistended, protuberant, normal bowel sounds, no masses, hepatomegaly, splenomegaly  Extremities:  no edema, redness, tenderness in the calves, right foot wrapped with splint and ACE bandage  Skin:  no gross lesions, rashes, induration    Assessment:        Hospital Problems         Last Modified POA    * (Principal) Acute osteomyelitis of metatarsal bone, right ([address] 10/26/2021 Yes    Neutrophilic leukocytosis 82/83/3361 Yes    IGT (impaired glucose tolerance) 10/26/2021 Yes    Benign essential HTN 10/22/2021 Yes          Plan:        1. Osteomyelitis of the right foot  ID and podiatry following. S/p TMA with CHRYSTAL with podiary ton 10/24. Remains on IV antibiotic therapy with vancomycin and Zosyn. Appreciate PMNR recs. Patient wants to go home.  Cultures are positive for Enterococcus, MRSA, B fragilis, viridans strep on wound culture. Need final dispo on antibiotics. Message sent to ID. Will plan on discharge today. 2. Essential hypertension  continue home medications  3. Neutrophilic leukocytosis - likely secondary to wound infection - downtrending today. 4. Impaired glucose tolerance - A1c 6.1. Continue healthy diet. For his age, this is great. 5. Disposition: Pt wanting to go home. Hopeful for oral abx today and d/c.     33 Rufina Rossi DO  10/26/2021  8:27 AM

## 2021-10-26 NOTE — DISCHARGE SUMMARY
[facility]  Office: [address], DO, Teodora Marks, DO, Jean Pierre Long, DO, Katia Greta Ramires, DO, Gus Austin MD, Trent Daniels MD, Prasanna Estevez MD, Katherine Fox MD, Denzel Da Silva MD, Bibi Calle MD, Beth Banks MD, Senait Marcelino MD, Mathew Lopes, DO, Caleb Benjamin DO, Cally Hwang MD,  Mayito Stockton DO, Anna Correa MD, Josh Justice MD, Tena Ford MD, Lieutenant Adithya MD, Fortunato Reed MD, Mayuri Flower MD, Joe Brian, [facility], [facility], CNP, Kamran Kenyon, CNP, Britney Bradshaw, CNS, Barrett Miles, CNP, Amanda Yi, CNP, Dano Allred, CNP, Marky Peterson, CNP, Adriana Waller, CNP, Rickie Ontiveros, CNP, Rasheed De Los Santos PA-C, Shanna Nunez, [facility], Teo Goel, CNP, Priyank Joseph, CNP, Herminio Flannery, CNP, Bailey Cherry, CNP, Vannesa Doll, CNP, Jayesh Trevino, CNP, Carolyn Salgado, [address] [place] [address]    Discharge Summary     Patient ID: Maikol Reyna  :  1940   MRN: [id number]     ACCOUNT:  [de-identified]   Patient's PCP: Amy Borden MD  Admit Date: 10/21/2021   Discharge Date: 10/26/2021    Length of Stay: 5  Code Status:  Full Code  Admitting Physician: Caleb Benjamin DO  Discharge Physician: Brionna Rossi DO     Active Discharge Diagnoses:     Hospital Problem Lists:  Principal Problem:    Acute osteomyelitis of metatarsal bone, right [facility])  Active Problems:    Neutrophilic leukocytosis    IGT (impaired glucose tolerance)    Benign essential HTN  Resolved Problems:    * No resolved hospital problems. *      Admission Condition:  fair     Discharged Condition: good    Hospital Stay:     Hospital Course: Maikol Reyna is a 78 y.o. male with a past medical history notable for hypertension and a leg length discrepancy after a previous femur fracture. He has a history of chronic foot wounds with osteomyelitis and subsequent amputations.  He presented to [facility] [place] on 10/21/2021 for evaluation of chronic wound on his right foot. He had an initial visit on day of admission at [facility] [address] [place] [address] who recommended evaluation in the ED for possible for MRI and antibiotics. Imaging demonstrates cellulitis with acute osteomyelitis of the 4th metatarsophalangeal joint and adjacent bony structures and acute/chronic osteomyelitis of the distal 2nd phalanx. Patient was started on broad spectrum IV antibiotics and Podiatry was was consulted from the ER. He underwent transmetatarsal amputation with CHRYSTAL with podiatry on 10/24. Infectious disease was consulted for acute osteomyelitis. Cultures were positive for Enterococcus, MRSA, B fragilis, and viridans strep. Blood pressure was controlled. Patient work with therapy. Unfortunate, patient believes that he will be able to use his right foot immediately after discharge. It was stressed that he needs to be nonweightbearing to let the wound heal.  PM&R was consulted as well, but he is electing to refuse SNF and go home with home care. Ultimately, he is appropriate for discharge today on oral antibiotics. There was an issue at time of discharge with pharmacy and nursing staff, as the patient felt he did not need to pay for his medications. Please see notes.     Significant therapeutic interventions:   Transmetatarsal amputation with podiatry on 10/24  BP control  IV antibiotics  ID evalutaion    Significant Diagnostic Studies:   Labs / Micro:  CBC:   Lab Results   Component Value Date    WBC 13.6 10/26/2021    RBC 4.59 10/26/2021    HGB 13.1 10/26/2021    HCT 42.2 10/26/2021    MCV 91.9 10/26/2021    MCH 28.5 10/26/2021    MCHC 31.0 10/26/2021    RDW 14.0 10/26/2021     10/26/2021     CMP:    Lab Results   Component Value Date    GLUCOSE 102 10/26/2021     10/26/2021    K 4.1 10/26/2021     10/26/2021    CO2 20 10/26/2021    BUN 17 10/26/2021    CREATININE 1.07 10/26/2021    ANIONGAP 12 10/26/2021    ALKPHOS 84 04/11/2018    ALT 14 04/11/2018    AST 16 04/11/2018    BILITOT 0.22 04/11/2018    LABALBU 3.3 10/26/2021    ALBUMIN 1.0 04/11/2018    LABGLOM >60 10/26/2021    GFRAA >60 10/26/2021    GFR      10/26/2021    GFR NOT REPORTED 10/26/2021    PROT 7.5 04/11/2018    CALCIUM 8.6 10/26/2021       Culture Abnormal  10/21/2021  8:34 PM [address] [facility] [address] Susceptibilities have not been performed.  Notify the laboratory within 7 days for further workup if clinically indicated. Culture Abnormal  10/21/2021  8:34  [address]   ENTEROCOCCUS FAECALIS LIGHT GROWTH    Culture Abnormal  10/21/2021  8:34  [address] RESISTANT STAPHYLOCOCCUS AUREUS SCANT GROWTH    Culture Abnormal  10/21/2021  8:34 PM [address] BETA LACTAMASE POSITIVE LIGHT GROWTH          Radiology:  XR FOOT RIGHT (MIN 3 VIEWS)    Result Date: 10/21/2021  Cellulitis with associated acute osteomyelitis involving the 4th metatarsophalangeal joint/adjacent bony structures. Acute or chronic osteomyelitis of the distal 2nd phalanx. MRI FOOT RIGHT W WO CONTRAST    Result Date: 10/22/2021  1. Deep ulceration extending to the 4th MTP joint with septic 4th MTP joint and osteomyelitis of the entirety of the 4th metatarsal and proximal phalanx of the 4th toe. Associated destructive changes of the 4th metatarsal head and neck. 2. Soft tissue edema and enhancement consistent with cellulitis. No well-defined drainable collection identified. 3. Patchy edema and mild enhancement at the distal amputation site of the residual 3rd metatarsal which is highly suggestive of early changes of osteomyelitis given adjacent soft tissue findings.        Consultations:    Consults:     Final Specialist Recommendations/Findings:   IP CONSULT TO PODIATRY  IP CONSULT TO HOSPITALIST  IP CONSULT TO INFECTIOUS DISEASES  IP CONSULT TO PODIATRY  IP MG capsule  Commonly known as: KEFLEX     ciprofloxacin 500 MG tablet  Commonly known as: CIPRO     doxycycline hyclate 100 MG capsule  Commonly known as: VIBRAMYCIN     Eucrisa 2 % Oint  Generic drug: Crisaborole           Where to Get Your Medications      These medications were sent to [facility] [address] [facility], [address] [place] [address]  [address] [facility], [facility] [address]    Phone: [phone]   · bisacodyl 5 MG EC tablet  · ibuprofen 800 MG tablet  · linezolid 600 MG tablet  · oxyCODONE 5 MG immediate release tablet         No discharge procedures on file. Time Spent on discharge is  43 mins in patient examination, evaluation, counseling as well as medication reconciliation, prescriptions for required medications, discharge plan and follow up. Electronically signed by   Elly Maharaj DO  10/26/2021  4:51 PM      Thank you Dr. Joey Muniz MD for the opportunity to be involved in this patient's care.

## 2021-10-26 NOTE — DISCHARGE INSTR - DIET
Good nutrition is important when healing from an illness, injury, or surgery. Follow any nutrition recommendations given to you during your hospital stay. If you were given an oral nutrition supplement while in the hospital, continue to take this supplement at home. You can take it with meals, in-between meals, and/or before bedtime. These supplements can be purchased at most local grocery stores, pharmacies, and chain [employer]-stores.    If you have any questions about your diet or nutrition, call the hospital and ask for the dietitian.    - Heart healthy diet

## 2021-10-26 NOTE — CARE COORDINATION
Cover my meds request sent to plan [id number] key, [facility] notified pt will be discharging today, will have oncall RN reach out to us, Dr Danilo Arais notified for home care order, will need RN ANTONIA completion. Pharmacy will bring medications up prior to them closing for pt    [phone] Spoke with RN on call for [facility], they will contact the patient tomorrow for follow up. Homecare order complete, awaiting ANTONIA completion    [address] relates they wrote off patients copays since he is insisting he has insurance to cover, [facility] [place] was verified also and pt still has copays, Pt notified Mindy Thibodeaux  will be following him and will contact him tomorrow, offered transportation and he declined stating \"I want this thing, pointing to foot dressing\" off my foot, then I want that thing \"boot\" applied and I plan to drive myself home. ANTONIA and home care order faxed to [facility].      Discharge [address] [facility]  Clinical Case Management Department  Written by: Lanie Maravilla RN    Patient Name: Dorina Lipoma  Attending Provider: Isabell Acotsa DO  Admit Date: 10/21/2021  6:35 PM  MRN: [id number]  Account: [de-identified]                     : 1940  Discharge Date: 10/26/2021      Disposition: home with Arden Vance RN

## 2021-10-26 NOTE — PROGRESS NOTES
CLINICAL PHARMACY NOTE: MEDS TO BEDS    Total # of Prescriptions Filled: 5   The following medications were delivered to the patient:  · Oxycodone 5   · Bisacodyl 5   · Coreg 12.5   · zyvox 600   · ibu 800     Additional Documentation:    zyvox needed a PA for zyvox we did that and it went through. Pt had candesartan at home per md. Meds given to RN. Patient did not pay co-pay per Shahbaz Morales due to patient being very irate.

## 2021-10-26 NOTE — PROGRESS NOTES
[facility] - Progress Note    Today's Date and Time: 10/26/2021, 11:28 AM    Impression :   1. Right foot deep plantar ulceration extending to the fourth MTP joint with septic fourth MTP joint and osteomyelitis of the fourth metatarsal and proximal phalanx of the fourth toe  2. S/P TMA Rt foot 10-23-21  3. Diabetes mellitus with associated neuropathy  4. Gastroesophageal reflux disease    Recommendations:   · Discontinue intravenous antimicrobial therapy with Zosyn and vancomycin. · Discharge on home treatment with po Linezolid 600 mg BID for MRSA. Stop date 11-3-21  · Wound care     Medical Decision Making/Summary/Discussion:10/26/2021     ·   Infection Control Recommendations   · [place] Precautions  Antimicrobial Stewardship Recommendations     · Targeted therapy  · IV to oral conversion  · PK dosing    Coordination of Outpatient Care:   · Estimated Length of IV antimicrobials: TBD  · Patient will need Midline Catheter Insertion: No  · Patient will need PICC line Insertion:No  · Patient will need: Home IV , [place],  SNF,  LTAC: TBD  · Patient will need outpatient wound care:Yes    Chief complaint/reason for consultation:   · Diabetic foot infection      History of Present Illness:   Aiyana Schafer is a 78y.o.-year-old  male who was initially admitted on 10/21/2021. Patient seen at the request of Dr. Gilberto Novak:    Patient is an 47-year-old male who has a history of diabetes mellitus with associated neuropathy, gastroesophageal reflux disease, hypertension, prior bouts of osteomyelitis, bilateral hip fractures requiring open reduction internal fixation and he does have a limb length discrepancy right greater than left which has led him to get calluses and ulcerations.     The patient has a chronic right foot ulceration and he had a debrided at the wound care center and it was recommended that he be admitted for an MRI of his foot to evaluate for osteomyelitis and to be treated with IV antibiotic therapy. The patient had an MRI of the foot that showed a deep ulceration extending to the fourth MTP joint with septic fourth MTP joint and osteomyelitis of the entirety of the fourth metatarsal and proximal phalanx of the fourth toe. There were associated destructive changes of the fourth metatarsal head and neck.     The patient is to be taken to the operating room for transmetatarsal amputation of the right foot with Achilles tendon lengthening of the right lower extremity and debridement of nonviable tissue and bone in the right foot on 10-23-21     We are asked to evaluate and help with antibiotic choice. CURRENT EVALUATION : 10/26/2021    Afebrile  VS stable    The patient seen and evaluated at bedside. Patient is better with no new acute issues or concerns today. Has tolerated antibiotics well  Will likely switch to po Linezolid for MRSA isolated from foot to facilitate discharge process. Labs, X rays reviewed: 10/26/2021    BUN: 17-->17  Cr: 1.02-->1.07    WBC: 16.1-->13.6  Hb: 12.4-->13.1  Plat: 456-->44    Cultures:  Urine:  ·   Blood:  ·   Sputum :  ·   Wound:  · 10-21-21: MRSA, B fragilis, enterococcus faecalis, Viridans strep group   · 10/22/21: MRSA, B.fragilis    Tissue from foot 10/23/21: no growth    Discussed with patient, RN. I have personally reviewed the past medical history, past surgical history, medications, social history, and family history, and I have updated the database accordingly.   Past Medical History:     Past Medical History:   Diagnosis Date    Arm fracture     Back injury     Eczema     Femur fracture (HCC)     GERD (gastroesophageal reflux disease)     Glaucoma     Hypertension     Idiopathic peripheral neuropathy 9/25/2017    MRSA (methicillin resistant staph aureus) culture positive 04/11/2018    foot    MSSA (methicillin susceptible Staphylococcus aureus)     Osteomyelitis ([facility] [address] 04/2018 right foot. Unable to treat with IV antibiotics, pt out of town on family matter    Shoulder fracture     Ulcerated, foot ([address]     wound       Past Surgical  History:     Past Surgical History:   Procedure Laterality Date    ACHILLES TENDON SURGERY Right 10/23/2021    ACHILLES TENDON LENGTHENING REPAIR performed by Leda Marie DPM at [address] COLONOSCOPY      FOOT AMPUTATION Right 10/23/2021    TRANSMETATARSAL AMPUTATION RIGHT FOOT performed by Leda Marie DPM at [facility]. Right 10/23/2021    Transmetatarsal amputation/Achilles tendon lengthening, right lower extremity/Debridement of non-viable tissue and bone , right foot    HIP SURGERY      ORIF left hip       Medications:      vancomycin  1,500 mg IntraVENous Q24H    polyethylene glycol  17 g Oral Daily    carvedilol  12.5 mg Oral BID WC    senna  2 tablet Oral Nightly    valsartan  80 mg Oral Daily    sodium chloride flush  5-40 mL IntraVENous 2 times per day    enoxaparin  40 mg SubCUTAneous Daily    piperacillin-tazobactam  3,375 mg IntraVENous Q8H    vancomycin (VANCOCIN) intermittent dosing (placeholder)   Other RX Placeholder       Social History:     Social History     Socioeconomic History    Marital status:       Spouse name: Not on file    Number of children: Not on file    Years of education: Not on file    Highest education level: Not on file   Occupational History    Not on file   Tobacco Use    Smoking status: Never Smoker    Smokeless tobacco: Never Used   Substance and Sexual Activity    Alcohol use: No    Drug use: No    Sexual activity: Not on file   Other Topics Concern    Not on file   Social History Narrative    Not on file     Social Determinants of Health     Financial Resource Strain:     Difficulty of Paying Living Expenses:    Food Insecurity:     Worried About Running Out of Food in the Last Year:     [address] in the Last Year: Transportation Needs:     Lack of Transportation (Medical):  Lack of Transportation (Non-Medical):    Physical Activity:     Days of Exercise per Week:     Minutes of Exercise per Session:    Stress:     Feeling of Stress :    Social Connections:     Frequency of Communication with Friends and Family:     Frequency of Social Gatherings with Friends and Family:     Attends Congregational Services:     Active Member of Clubs or Organizations:     Attends Club or Organization Meetings:     Marital Status:    Intimate Partner Violence:     Fear of Current or Ex-Partner:     Emotionally Abused:     Physically Abused:     Sexually Abused:        Family History:   No family history on file. Allergies:   Patient has no known allergies. Review of Systems:   Constitutional: No fevers or chills. No systemic complaints  Head: No headaches  Eyes: No double vision or blurry vision. No conjunctival inflammation. ENT: No sore throat or runny nose. . No hearing loss, tinnitus or vertigo. Cardiovascular: No chest pain or palpitations. No shortness of breath. No ZABALA  Lung: No shortness of breath or cough. No sputum production  Abdomen: No nausea, vomiting, diarrhea, or abdominal pain. Fab Gutter No cramps. Genitourinary: No increased urinary frequency, or dysuria. No hematuria. No suprapubic or CVA pain  Musculoskeletal: No muscle aches or pains. No joint effusions, swelling or deformities. Rt TMA  Hematologic: No bleeding or bruising. Neurologic: No headache, weakness, numbness, or tingling. Integument: No rash, no ulcers. Psychiatric: No depression. Endocrine: No polyuria, no polydipsia, no polyphagia.     Physical Examination :     Patient Vitals for the past 8 hrs:   BP Temp Temp src Pulse Resp SpO2   10/26/21 0700 121/68 98.3 °F (36.8 °C) Oral 75 18 98 %     General Appearance: Awake, alert, and in no apparent distress  Head:  Normocephalic, no trauma  Eyes: Pupils equal, round, reactive to light and accommodation; extraocular movements intact; sclera anicteric; conjunctivae pink. No embolic phenomena. ENT: Oropharynx clear, without erythema, exudate, or thrush. No tenderness of sinuses. Mouth/throat: mucosa pink and moist. No lesions. Dentition in good repair. Neck:Supple, without lymphadenopathy. Thyroid normal, No bruits. Pulmonary/Chest: Clear to auscultation, without wheezes, rales, or rhonchi. No dullness to percussion. Cardiovascular: Regular rate and rhythm without murmurs, rubs, or gallops. Abdomen: Soft, non tender. Bowel sounds normal. No organomegaly  All four Extremities: No cyanosis, clubbing, edema, or effusions. Rt TMA  Neurologic: No gross sensory or motor deficits. Skin: Warm and dry with good turgor. Signs of peripheral arterial insufficiency. Rt TMA    Medical Decision Making -Laboratory:   I have independently reviewed/ordered the following labs:    CBC with Differential:   Recent Labs     10/25/21  0546 10/26/21  0449   WBC 16.1* 13.6*   HGB 12.4* 13.1   HCT 40.6* 42.2   * 444   LYMPHOPCT 18* 24   MONOPCT 9 10     BMP:   Recent Labs     10/25/21  0546 10/26/21  0449    137   K 4.0 4.1    105   CO2 19* 20   BUN 17 17   CREATININE 1.02 1.07   MG 2.4 2.3     Hepatic Function Panel:   Recent Labs     10/25/21  0546 10/26/21  0449   LABALBU 3.1* 3.3*     No results for input(s): RPR in the last 72 hours. No results for input(s): HIV in the last 72 hours. No results for input(s): BC in the last 72 hours. Lab Results   Component Value Date    RBC 4.59 10/26/2021    WBC 13.6 10/26/2021     Lab Results   Component Value Date    CREATININE 1.07 10/26/2021    GLUCOSE 102 10/26/2021       Medical Decision Making-Imaging:       Medical Decision [facility]:     Component Collected Lab   Specimen Description 10/22/2021 12:25  [address]   . WOUND RIGHT    Special Requests 10/22/2021 12:25  [address]   NOT REPORTED    Direct Exam Abnormal  10/22/2021 12:25  [address]    Direct Exam Abnormal  10/22/2021 12:25 AM [address] NEGATIVE RODS    Direct Exam Abnormal  10/22/2021 12:25  [address]   FEW GRAM POSITIVE COCCOBACILLI    Culture Abnormal  10/22/2021 12:25  [address] RESISTANT STAPHYLOCOCCUS AUREUS SCANT GROWTH    Culture 10/22/2021 12:25  [address]   NORMAL CHERI    Culture Abnormal  10/22/2021 12:25  [address]   BACTEROIDES FRAGILIS BETA LACTAMASE POSITIVE LIGHT GROWTH    Testing Performed By        Medical Decision Making-Other:     Note:  · Labs, medications, radiologic studies were reviewed with personal review of films  · Moderate to Large amounts of data were reviewed  · Discussed with nursing Staff, Discharge planner  · Infection Control and Prevention measures reviewed  · All prior entries were reviewed  · Administer medications as ordered  · Prognosis: Fair  · Discharge planning reviewed  · Follow up as outpatient. Thank you for allowing us to participate in the care of this patient. Please call with questions. Bonny Lauren MD, PGY-1  Infectious Disease/ Internal Medicine Resident  [address], [place]      ATTESTATION:    I have discussed the case, including pertinent history and exam findings with the residents and students. I have seen and examined the patient and the key elements of the encounter have been performed by me. I was present when the student obtained his information or examined the patient. I have reviewed the laboratory data, other diagnostic studies and discussed them with the residents. I have updated the medical record where necessary. I agree with the assessment, plan and orders as documented by the resident/ student.     Jolie Tirado MD.    Pager: [phone] - Office: [phone]

## 2021-10-27 LAB
CULTURE: NORMAL
CULTURE: NORMAL
Lab: NORMAL
Lab: NORMAL
SPECIMEN DESCRIPTION: NORMAL
SPECIMEN DESCRIPTION: NORMAL

## 2021-10-29 LAB
CULTURE: ABNORMAL
CULTURE: ABNORMAL
DIRECT EXAM: ABNORMAL
DIRECT EXAM: ABNORMAL
Lab: ABNORMAL
SPECIMEN DESCRIPTION: ABNORMAL

## 2021-11-22 LAB
CULTURE: NORMAL
Lab: NORMAL
SPECIMEN DESCRIPTION: NORMAL

## 2024-05-04 ENCOUNTER — HOSPITAL ENCOUNTER (EMERGENCY)
Facility: CLINIC | Age: 84
Discharge: ANOTHER HOSPITAL | End: 2024-05-05
Attending: STUDENT IN AN ORGANIZED HEALTH CARE EDUCATION/TRAINING PROGRAM
Payer: MEDICARE

## 2024-05-04 ENCOUNTER — APPOINTMENT (OUTPATIENT)
Dept: CT IMAGING | Facility: CLINIC | Age: 84
End: 2024-05-04
Payer: MEDICARE

## 2024-05-04 VITALS
TEMPERATURE: 97.9 F | OXYGEN SATURATION: 96 % | RESPIRATION RATE: 18 BRPM | WEIGHT: 217 LBS | DIASTOLIC BLOOD PRESSURE: 74 MMHG | HEIGHT: 73 IN | SYSTOLIC BLOOD PRESSURE: 162 MMHG | BODY MASS INDEX: 28.76 KG/M2 | HEART RATE: 82 BPM

## 2024-05-04 DIAGNOSIS — Z78.9 UNABLE TO CARE FOR SELF: ICD-10-CM

## 2024-05-04 DIAGNOSIS — R41.0 CONFUSION: Primary | ICD-10-CM

## 2024-05-04 DIAGNOSIS — R41.82 ALTERED MENTAL STATUS, UNSPECIFIED ALTERED MENTAL STATUS TYPE: ICD-10-CM

## 2024-05-04 LAB
ALBUMIN SERPL-MCNC: 3.8 G/DL (ref 3.5–5.2)
ALBUMIN/GLOB SERPL: 1.1 {RATIO} (ref 1–2.5)
ALP SERPL-CCNC: 96 U/L (ref 40–129)
ALT SERPL-CCNC: 13 U/L (ref 5–41)
ANION GAP SERPL CALCULATED.3IONS-SCNC: 11 MMOL/L (ref 9–17)
AST SERPL-CCNC: 15 U/L
BACTERIA URNS QL MICRO: NORMAL
BASOPHILS # BLD: 0.1 K/UL (ref 0–0.2)
BASOPHILS NFR BLD: 1 % (ref 0–2)
BILIRUB DIRECT SERPL-MCNC: <0.1 MG/DL
BILIRUB INDIRECT SERPL-MCNC: ABNORMAL MG/DL (ref 0–1)
BILIRUB SERPL-MCNC: 0.2 MG/DL (ref 0.3–1.2)
BILIRUB UR QL STRIP: NEGATIVE
BUN SERPL-MCNC: 14 MG/DL (ref 8–23)
CALCIUM SERPL-MCNC: 9.3 MG/DL (ref 8.6–10.4)
CHLORIDE SERPL-SCNC: 104 MMOL/L (ref 98–107)
CLARITY UR: CLEAR
CO2 SERPL-SCNC: 22 MMOL/L (ref 20–31)
COLOR UR: YELLOW
CREAT SERPL-MCNC: 1 MG/DL (ref 0.7–1.2)
EOSINOPHIL # BLD: 0.3 K/UL (ref 0–0.4)
EOSINOPHILS RELATIVE PERCENT: 2 % (ref 1–4)
EPI CELLS #/AREA URNS HPF: NORMAL /HPF (ref 0–5)
ERYTHROCYTE [DISTWIDTH] IN BLOOD BY AUTOMATED COUNT: 14.5 % (ref 12.5–15.4)
GFR, ESTIMATED: 73 ML/MIN/1.73M2
GLUCOSE SERPL-MCNC: 105 MG/DL (ref 70–99)
GLUCOSE UR STRIP-MCNC: NEGATIVE MG/DL
HCT VFR BLD AUTO: 45 % (ref 41–53)
HGB BLD-MCNC: 15.3 G/DL (ref 13.5–17.5)
HGB UR QL STRIP.AUTO: NEGATIVE
KETONES UR STRIP-MCNC: NEGATIVE MG/DL
LACTATE BLDV-SCNC: 1.6 MMOL/L (ref 0.5–2.2)
LEUKOCYTE ESTERASE UR QL STRIP: NEGATIVE
LIPASE SERPL-CCNC: 51 U/L (ref 13–60)
LYMPHOCYTES NFR BLD: 2.8 K/UL (ref 1–4.8)
LYMPHOCYTES RELATIVE PERCENT: 19 % (ref 24–44)
MCH RBC QN AUTO: 30.8 PG (ref 26–34)
MCHC RBC AUTO-ENTMCNC: 34.1 G/DL (ref 31–37)
MCV RBC AUTO: 90.2 FL (ref 80–100)
MONOCYTES NFR BLD: 1.3 K/UL (ref 0.1–1.2)
MONOCYTES NFR BLD: 9 % (ref 2–11)
NEUTROPHILS NFR BLD: 69 % (ref 36–66)
NEUTS SEG NFR BLD: 10 K/UL (ref 1.8–7.7)
NITRITE UR QL STRIP: NEGATIVE
PH UR STRIP: 5.5 [PH] (ref 5–8)
PLATELET # BLD AUTO: 322 K/UL (ref 140–450)
PMV BLD AUTO: 7.9 FL (ref 6–12)
POTASSIUM SERPL-SCNC: 4.1 MMOL/L (ref 3.7–5.3)
PROT SERPL-MCNC: 7.2 G/DL (ref 6.4–8.3)
PROT UR STRIP-MCNC: NEGATIVE MG/DL
RBC # BLD AUTO: 4.99 M/UL (ref 4.5–5.9)
RBC #/AREA URNS HPF: NORMAL /HPF (ref 0–2)
SODIUM SERPL-SCNC: 137 MMOL/L (ref 135–144)
SP GR UR STRIP: 1.02 (ref 1–1.03)
TROPONIN I SERPL HS-MCNC: 8 NG/L (ref 0–22)
UROBILINOGEN UR STRIP-ACNC: NORMAL EU/DL (ref 0–1)
WBC #/AREA URNS HPF: NORMAL /HPF (ref 0–5)
WBC OTHER # BLD: 14.4 K/UL (ref 3.5–11)

## 2024-05-04 PROCEDURE — 70450 CT HEAD/BRAIN W/O DYE: CPT

## 2024-05-04 PROCEDURE — 80048 BASIC METABOLIC PNL TOTAL CA: CPT

## 2024-05-04 PROCEDURE — 84484 ASSAY OF TROPONIN QUANT: CPT

## 2024-05-04 PROCEDURE — 93005 ELECTROCARDIOGRAM TRACING: CPT | Performed by: STUDENT IN AN ORGANIZED HEALTH CARE EDUCATION/TRAINING PROGRAM

## 2024-05-04 PROCEDURE — 87086 URINE CULTURE/COLONY COUNT: CPT

## 2024-05-04 PROCEDURE — 83605 ASSAY OF LACTIC ACID: CPT

## 2024-05-04 PROCEDURE — 85025 COMPLETE CBC W/AUTO DIFF WBC: CPT

## 2024-05-04 PROCEDURE — 80076 HEPATIC FUNCTION PANEL: CPT

## 2024-05-04 PROCEDURE — 81001 URINALYSIS AUTO W/SCOPE: CPT

## 2024-05-04 PROCEDURE — 99285 EMERGENCY DEPT VISIT HI MDM: CPT

## 2024-05-04 PROCEDURE — 83690 ASSAY OF LIPASE: CPT

## 2024-05-04 PROCEDURE — 36415 COLL VENOUS BLD VENIPUNCTURE: CPT

## 2024-05-04 ASSESSMENT — PAIN - FUNCTIONAL ASSESSMENT: PAIN_FUNCTIONAL_ASSESSMENT: NONE - DENIES PAIN

## 2024-05-05 NOTE — ED PROVIDER NOTES
[facility]  Emergency Department Encounter  [facility]       Pt Name:Omar Vazquez  MRN: [id number]  Birthdate 1940  Date of evaluation: 24  PCP:  Corey Shah MD      CHIEF COMPLAINT       Chief Complaint   Patient presents with    Altered Mental Status     Pt drove himself from home due to not \"feeling right\". Pt lives alone. Hx of fall couple months and sent to [facility] re[facility]. Pt denies any fall. Pt confused with time and place and easily redirected. Pt denies any pain. Pt ambulatory to room with cane. Gait steady.       HISTORY OF PRESENT ILLNESS     Omar Vazquez is a 84 y.o. male who presents via personal vehicle with vague symptoms, chief complaint \"feeling off\".  Patient is reporting that he would just like to be checked out.  He had a fall multiple months ago which did require acute rehab at [facility] secondary to a hip fracture.  He has reported improvement of those symptoms, he has no pain there today.  No new injury.  No new falls.  Today he presents via personal vehicle, he drove himself to our facility although does admit he was attempting to get to floor [facility] although was unable to navigate himself there.  He was unable to tell us the name of our hospital.  He did not specifically remember his birthday although was able to tell us it was the other day.  He reports decreased oral intake secondary to not having much food at home.  He does not report financial burden as barrier he reports that he is overall not very active and not out and about getting groceries.  He has concerns for his social [place] being very small.  When asked if he is depressed he reports that he feels sad that his wife  20 years ago and his social [place] is so small although he denies suicidal or homicidal ideation.  He denies chest pain.  He denies shortness of breath.  He denies focal weakness or numbness.    Patient is noted to have waxing and waning

## 2024-05-05 NOTE — ED NOTES
jason Choi access calls with Bed 617 at [facility], trx to Transportation, Christian has transportation scheduled for 0430.

## 2024-05-06 LAB
EKG ATRIAL RATE: 72 BPM
EKG P AXIS: 14 DEGREES
EKG P-R INTERVAL: 192 MS
EKG Q-T INTERVAL: 380 MS
EKG QRS DURATION: 86 MS
EKG QTC CALCULATION (BAZETT): 416 MS
EKG R AXIS: -21 DEGREES
EKG T AXIS: 20 DEGREES
EKG VENTRICULAR RATE: 72 BPM
MICROORGANISM SPEC CULT: NORMAL
SPECIMEN DESCRIPTION: NORMAL

## 2024-05-23 ENCOUNTER — HOSPITAL ENCOUNTER (OUTPATIENT)
Age: 84
Setting detail: SPECIMEN
Discharge: HOME | End: 2024-05-23

## 2024-05-23 LAB
ALBUMIN SERPL-MCNC: 3.2 G/DL (ref 3.5–5.2)
ALP SERPL-CCNC: 79 U/L (ref 40–129)
ALT SERPL-CCNC: 22 U/L (ref 5–41)
ANION GAP SERPL CALCULATED.3IONS-SCNC: 11 MMOL/L (ref 9–17)
AST SERPL-CCNC: 15 U/L
BASOPHILS # BLD: 0.14 K/UL (ref 0–0.2)
BASOPHILS NFR BLD: 1 % (ref 0–2)
BILIRUB SERPL-MCNC: 0.5 MG/DL (ref 0.3–1.2)
BUN SERPL-MCNC: 14 MG/DL (ref 8–23)
BUN/CREAT SERPL: 14 (ref 9–20)
CALCIUM SERPL-MCNC: 8.8 MG/DL (ref 8.6–10.4)
CHLORIDE SERPL-SCNC: 103 MMOL/L (ref 98–107)
CO2 SERPL-SCNC: 23 MMOL/L (ref 20–31)
CREAT SERPL-MCNC: 1 MG/DL (ref 0.7–1.2)
EOSINOPHIL # BLD: 0.41 K/UL (ref 0–0.44)
EOSINOPHILS RELATIVE PERCENT: 3 % (ref 1–4)
ERYTHROCYTE [DISTWIDTH] IN BLOOD BY AUTOMATED COUNT: 14.8 % (ref 11.8–14.4)
GFR, ESTIMATED: 73 ML/MIN/1.73M2
GLUCOSE SERPL-MCNC: 100 MG/DL (ref 70–99)
HCT VFR BLD AUTO: 47.1 % (ref 40.7–50.3)
HGB BLD-MCNC: 14.9 G/DL (ref 13–17)
IMM GRANULOCYTES # BLD AUTO: 0.69 K/UL (ref 0–0.3)
IMM GRANULOCYTES NFR BLD: 5 %
LYMPHOCYTES NFR BLD: 2.33 K/UL (ref 1.1–3.7)
LYMPHOCYTES RELATIVE PERCENT: 17 % (ref 24–43)
MCH RBC QN AUTO: 29.9 PG (ref 25.2–33.5)
MCHC RBC AUTO-ENTMCNC: 31.6 G/DL (ref 28.4–34.8)
MCV RBC AUTO: 94.6 FL (ref 82.6–102.9)
MONOCYTES NFR BLD: 1.23 K/UL (ref 0.1–1.2)
MONOCYTES NFR BLD: 9 % (ref 3–12)
NEUTROPHILS NFR BLD: 65 % (ref 36–65)
NEUTS SEG NFR BLD: 8.9 K/UL (ref 1.5–8.1)
NRBC BLD-RTO: 0 PER 100 WBC
PLATELET # BLD AUTO: 345 K/UL (ref 138–453)
PMV BLD AUTO: 9.3 FL (ref 8.1–13.5)
POTASSIUM SERPL-SCNC: 4.4 MMOL/L (ref 3.7–5.3)
PROT SERPL-MCNC: 6.3 G/DL (ref 6.4–8.3)
RBC # BLD AUTO: 4.98 M/UL (ref 4.21–5.77)
SODIUM SERPL-SCNC: 137 MMOL/L (ref 135–144)
WBC OTHER # BLD: 13.7 K/UL (ref 3.5–11.3)

## 2024-05-23 PROCEDURE — 36415 COLL VENOUS BLD VENIPUNCTURE: CPT

## 2024-05-23 PROCEDURE — 85025 COMPLETE CBC W/AUTO DIFF WBC: CPT

## 2024-05-23 PROCEDURE — 80053 COMPREHEN METABOLIC PANEL: CPT

## 2024-05-23 PROCEDURE — P9603 ONE-WAY ALLOW PRORATED MILES: HCPCS

## 2024-05-24 ENCOUNTER — HOSPITAL ENCOUNTER (OUTPATIENT)
Age: 84
Setting detail: SPECIMEN
Discharge: HOME | End: 2024-05-24

## 2024-05-24 LAB
ANION GAP SERPL CALCULATED.3IONS-SCNC: 10 MMOL/L (ref 9–17)
BUN SERPL-MCNC: 15 MG/DL (ref 8–23)
BUN/CREAT SERPL: 15 (ref 9–20)
CALCIUM SERPL-MCNC: 8.8 MG/DL (ref 8.6–10.4)
CHLORIDE SERPL-SCNC: 102 MMOL/L (ref 98–107)
CO2 SERPL-SCNC: 25 MMOL/L (ref 20–31)
CREAT SERPL-MCNC: 1 MG/DL (ref 0.7–1.2)
ERYTHROCYTE [DISTWIDTH] IN BLOOD BY AUTOMATED COUNT: 14.7 % (ref 11.8–14.4)
GFR, ESTIMATED: 73 ML/MIN/1.73M2
GLUCOSE SERPL-MCNC: 97 MG/DL (ref 70–99)
HCT VFR BLD AUTO: 45.4 % (ref 40.7–50.3)
HGB BLD-MCNC: 14.3 G/DL (ref 13–17)
MCH RBC QN AUTO: 30 PG (ref 25.2–33.5)
MCHC RBC AUTO-ENTMCNC: 31.5 G/DL (ref 28.4–34.8)
MCV RBC AUTO: 95.4 FL (ref 82.6–102.9)
NRBC BLD-RTO: 0 PER 100 WBC
PLATELET # BLD AUTO: 400 K/UL (ref 138–453)
PMV BLD AUTO: 9.7 FL (ref 8.1–13.5)
POTASSIUM SERPL-SCNC: 4.1 MMOL/L (ref 3.7–5.3)
RBC # BLD AUTO: 4.76 M/UL (ref 4.21–5.77)
SODIUM SERPL-SCNC: 137 MMOL/L (ref 135–144)
WBC OTHER # BLD: 14.5 K/UL (ref 3.5–11.3)

## 2024-05-24 PROCEDURE — 36415 COLL VENOUS BLD VENIPUNCTURE: CPT

## 2024-05-24 PROCEDURE — P9603 ONE-WAY ALLOW PRORATED MILES: HCPCS

## 2024-05-24 PROCEDURE — 85027 COMPLETE CBC AUTOMATED: CPT

## 2024-05-24 PROCEDURE — 80048 BASIC METABOLIC PNL TOTAL CA: CPT

## 2024-05-30 PROCEDURE — 81003 URINALYSIS AUTO W/O SCOPE: CPT

## 2024-05-30 PROCEDURE — 87186 SC STD MICRODIL/AGAR DIL: CPT

## 2024-05-30 PROCEDURE — 87086 URINE CULTURE/COLONY COUNT: CPT

## 2024-05-30 PROCEDURE — 86403 PARTICLE AGGLUT ANTBDY SCRN: CPT

## 2024-05-31 ENCOUNTER — HOSPITAL ENCOUNTER (OUTPATIENT)
Age: 84
Setting detail: SPECIMEN
Discharge: HOME | End: 2024-05-31

## 2024-05-31 LAB
BILIRUB UR QL STRIP: NEGATIVE
CLARITY UR: CLEAR
COLOR UR: YELLOW
COMMENT: ABNORMAL
GLUCOSE UR STRIP-MCNC: NEGATIVE MG/DL
HGB UR QL STRIP.AUTO: NEGATIVE
KETONES UR STRIP-MCNC: ABNORMAL MG/DL
LEUKOCYTE ESTERASE UR QL STRIP: NEGATIVE
NITRITE UR QL STRIP: NEGATIVE
PH UR STRIP: 6 [PH] (ref 5–8)
PROT UR STRIP-MCNC: NEGATIVE MG/DL
SP GR UR STRIP: 1.02 (ref 1–1.03)
UROBILINOGEN UR STRIP-ACNC: NORMAL EU/DL (ref 0–1)

## 2024-06-02 LAB
MICROORGANISM SPEC CULT: ABNORMAL
SPECIMEN DESCRIPTION: ABNORMAL

## 2024-06-03 ENCOUNTER — HOSPITAL ENCOUNTER (OUTPATIENT)
Age: 84
Setting detail: SPECIMEN
Discharge: HOME | End: 2024-06-03

## 2024-06-04 ENCOUNTER — HOSPITAL ENCOUNTER (OUTPATIENT)
Age: 84
Setting detail: SPECIMEN
Discharge: HOME | End: 2024-06-04

## 2024-06-04 LAB
ANION GAP SERPL CALCULATED.3IONS-SCNC: 8 MMOL/L (ref 9–17)
BUN SERPL-MCNC: 13 MG/DL (ref 8–23)
BUN/CREAT SERPL: 14 (ref 9–20)
CALCIUM SERPL-MCNC: 8.9 MG/DL (ref 8.6–10.4)
CHLORIDE SERPL-SCNC: 105 MMOL/L (ref 98–107)
CO2 SERPL-SCNC: 27 MMOL/L (ref 20–31)
CREAT SERPL-MCNC: 0.9 MG/DL (ref 0.7–1.2)
ERYTHROCYTE [DISTWIDTH] IN BLOOD BY AUTOMATED COUNT: 14.6 % (ref 11.8–14.4)
GFR, ESTIMATED: 83 ML/MIN/1.73M2
GLUCOSE SERPL-MCNC: 91 MG/DL (ref 70–99)
HCT VFR BLD AUTO: 46.9 % (ref 40.7–50.3)
HGB BLD-MCNC: 14.9 G/DL (ref 13–17)
MCH RBC QN AUTO: 30 PG (ref 25.2–33.5)
MCHC RBC AUTO-ENTMCNC: 31.8 G/DL (ref 28.4–34.8)
MCV RBC AUTO: 94.6 FL (ref 82.6–102.9)
NRBC BLD-RTO: 0 PER 100 WBC
PLATELET # BLD AUTO: 305 K/UL (ref 138–453)
PMV BLD AUTO: 10.1 FL (ref 8.1–13.5)
POTASSIUM SERPL-SCNC: 4.1 MMOL/L (ref 3.7–5.3)
RBC # BLD AUTO: 4.96 M/UL (ref 4.21–5.77)
SODIUM SERPL-SCNC: 140 MMOL/L (ref 135–144)
TSH SERPL DL<=0.05 MIU/L-ACNC: 3.8 UIU/ML (ref 0.3–5)
WBC OTHER # BLD: 11.3 K/UL (ref 3.5–11.3)

## 2024-06-04 PROCEDURE — 84443 ASSAY THYROID STIM HORMONE: CPT

## 2024-06-04 PROCEDURE — 36415 COLL VENOUS BLD VENIPUNCTURE: CPT

## 2024-06-04 PROCEDURE — 85027 COMPLETE CBC AUTOMATED: CPT

## 2024-06-04 PROCEDURE — 80048 BASIC METABOLIC PNL TOTAL CA: CPT

## 2024-06-04 PROCEDURE — P9603 ONE-WAY ALLOW PRORATED MILES: HCPCS

## 2024-06-05 ENCOUNTER — HOSPITAL ENCOUNTER (OUTPATIENT)
Age: 84
Setting detail: SPECIMEN
Discharge: HOME | End: 2024-06-05

## 2024-06-05 PROCEDURE — 86403 PARTICLE AGGLUT ANTBDY SCRN: CPT

## 2024-06-05 PROCEDURE — 87186 SC STD MICRODIL/AGAR DIL: CPT

## 2024-06-05 PROCEDURE — 81003 URINALYSIS AUTO W/O SCOPE: CPT

## 2024-06-05 PROCEDURE — 87086 URINE CULTURE/COLONY COUNT: CPT

## 2024-06-06 ENCOUNTER — HOSPITAL ENCOUNTER (OUTPATIENT)
Age: 84
Setting detail: SPECIMEN
Discharge: HOME | End: 2024-06-06

## 2024-06-06 LAB
ANION GAP SERPL CALCULATED.3IONS-SCNC: 12 MMOL/L (ref 9–17)
BASOPHILS # BLD: 0.04 K/UL (ref 0–0.2)
BASOPHILS NFR BLD: 0 % (ref 0–2)
BILIRUB UR QL STRIP: NEGATIVE
BUN SERPL-MCNC: 14 MG/DL (ref 8–23)
BUN/CREAT SERPL: 16 (ref 9–20)
CALCIUM SERPL-MCNC: 8.9 MG/DL (ref 8.6–10.4)
CHLORIDE SERPL-SCNC: 104 MMOL/L (ref 98–107)
CLARITY UR: CLEAR
CO2 SERPL-SCNC: 24 MMOL/L (ref 20–31)
COLOR UR: YELLOW
COMMENT: NORMAL
CREAT SERPL-MCNC: 0.9 MG/DL (ref 0.7–1.2)
EOSINOPHIL # BLD: 0.31 K/UL (ref 0–0.44)
EOSINOPHILS RELATIVE PERCENT: 3 % (ref 1–4)
ERYTHROCYTE [DISTWIDTH] IN BLOOD BY AUTOMATED COUNT: 14.7 % (ref 11.8–14.4)
GFR, ESTIMATED: 83 ML/MIN/1.73M2
GLUCOSE SERPL-MCNC: 83 MG/DL (ref 70–99)
GLUCOSE UR STRIP-MCNC: NEGATIVE MG/DL
HCT VFR BLD AUTO: 45 % (ref 40.7–50.3)
HGB BLD-MCNC: 14.2 G/DL (ref 13–17)
HGB UR QL STRIP.AUTO: NEGATIVE
IMM GRANULOCYTES # BLD AUTO: 0.09 K/UL (ref 0–0.3)
IMM GRANULOCYTES NFR BLD: 1 %
KETONES UR STRIP-MCNC: NEGATIVE MG/DL
LEUKOCYTE ESTERASE UR QL STRIP: NEGATIVE
LYMPHOCYTES NFR BLD: 2.47 K/UL (ref 1.1–3.7)
LYMPHOCYTES RELATIVE PERCENT: 21 % (ref 24–43)
MCH RBC QN AUTO: 30.1 PG (ref 25.2–33.5)
MCHC RBC AUTO-ENTMCNC: 31.6 G/DL (ref 28.4–34.8)
MCV RBC AUTO: 95.3 FL (ref 82.6–102.9)
MONOCYTES NFR BLD: 0.98 K/UL (ref 0.1–1.2)
MONOCYTES NFR BLD: 8 % (ref 3–12)
NEUTROPHILS NFR BLD: 67 % (ref 36–65)
NEUTS SEG NFR BLD: 7.79 K/UL (ref 1.5–8.1)
NITRITE UR QL STRIP: NEGATIVE
NRBC BLD-RTO: 0 PER 100 WBC
PH UR STRIP: 6 [PH] (ref 5–8)
PLATELET # BLD AUTO: 253 K/UL (ref 138–453)
PMV BLD AUTO: 10.2 FL (ref 8.1–13.5)
POTASSIUM SERPL-SCNC: 4.4 MMOL/L (ref 3.7–5.3)
PROT UR STRIP-MCNC: NEGATIVE MG/DL
RBC # BLD AUTO: 4.72 M/UL (ref 4.21–5.77)
RBC # BLD: ABNORMAL 10*6/UL
SODIUM SERPL-SCNC: 140 MMOL/L (ref 135–144)
SP GR UR STRIP: 1.02 (ref 1–1.03)
UROBILINOGEN UR STRIP-ACNC: NORMAL EU/DL (ref 0–1)
VALPROATE SERPL-MCNC: 23 UG/ML (ref 50–125)
WBC OTHER # BLD: 11.7 K/UL (ref 3.5–11.3)

## 2024-06-06 PROCEDURE — 36415 COLL VENOUS BLD VENIPUNCTURE: CPT

## 2024-06-06 PROCEDURE — 80048 BASIC METABOLIC PNL TOTAL CA: CPT

## 2024-06-06 PROCEDURE — 85025 COMPLETE CBC W/AUTO DIFF WBC: CPT

## 2024-06-06 PROCEDURE — 80164 ASSAY DIPROPYLACETIC ACD TOT: CPT

## 2024-06-06 PROCEDURE — P9603 ONE-WAY ALLOW PRORATED MILES: HCPCS

## 2024-06-08 LAB
MICROORGANISM SPEC CULT: ABNORMAL
MICROORGANISM SPEC CULT: ABNORMAL
SERVICE CMNT-IMP: ABNORMAL
SPECIMEN DESCRIPTION: ABNORMAL

## (undated) DEVICE — BANDAGE COMPR W6INXL12FT SMOOTH FOR LIMB EXSANG ESMARCH

## (undated) DEVICE — SOLUTION SCRB 4OZ 4% CHG H2O AIDED FOR PREOPERATIVE SKIN

## (undated) DEVICE — CUFF REPROC TRNQT DPSB W/PLC RED 18IN

## (undated) DEVICE — INTENDED FOR TISSUE SEPARATION, AND OTHER PROCEDURES THAT REQUIRE A SHARP SURGICAL BLADE TO PUNCTURE OR CUT.: Brand: BARD-PARKER ® CARBON RIB-BACK BLADES

## (undated) DEVICE — NEEDLE FLTR 18GA L1.5IN MEM THK5UM BLNT DISP

## (undated) DEVICE — SYRINGE, LUER LOCK, 10ML: Brand: MEDLINE

## (undated) DEVICE — SPLINT ORTH W5XL30IN WHT FBRGLS 1 SIDE FELT PD CNFRM LO

## (undated) DEVICE — GLOVE ORANGE PI 7 1/2   MSG9075

## (undated) DEVICE — SUTURE ETHLN SZ 3-0 L18IN NONABSORBABLE BLK L24MM PS-1 3/8 1663G

## (undated) DEVICE — DRAPE,REIN 53X77,STERILE: Brand: MEDLINE

## (undated) DEVICE — Z DISCONTINUED BY MEDLINE USE 2711682 TRAY SKIN PREP DRY W/ PREM GLV

## (undated) DEVICE — PADDING CAST W6INXL4YD COT LO LINTING WYTEX

## (undated) DEVICE — GOWN,SIRUS,NONRNF,SETINSLV,XL,20/CS: Brand: MEDLINE

## (undated) DEVICE — SUTURE VIC + ABS BR UD X1 2-0 27IN VCP459H

## (undated) DEVICE — THIN OFFSET (9.0 X 0.38 X 31.0MM)

## (undated) DEVICE — SUTURE NONABSORBABLE MONOFILAMENT 4-0 PS-2 18 IN BLK ETHILON 1667G

## (undated) DEVICE — PADDING,UNDERCAST,COTTON, 4"X4YD STERILE: Brand: MEDLINE

## (undated) DEVICE — NEEDLE HYPO 25GA L1.5IN BLU POLYPR HUB S STL REG BVL STR

## (undated) DEVICE — GLOVE ORTHO 8   MSG9480

## (undated) DEVICE — SUTURE VIC + UD PS-2 3-0 18IN VCP497G

## (undated) DEVICE — CONTAINER,SPECIMEN,4OZ,OR STRL: Brand: MEDLINE

## (undated) DEVICE — BANDAGE,GAUZE,BULKEE II,4.5"X4.1YD,STRL: Brand: MEDLINE

## (undated) DEVICE — SVMMC POD PK

## (undated) DEVICE — BNDG,ELSTC,MATRIX,STRL,6"X5YD,LF,HOOK&LP: Brand: MEDLINE